# Patient Record
Sex: FEMALE | Race: WHITE | Employment: FULL TIME | URBAN - NONMETROPOLITAN AREA
[De-identification: names, ages, dates, MRNs, and addresses within clinical notes are randomized per-mention and may not be internally consistent; named-entity substitution may affect disease eponyms.]

---

## 2019-08-28 ENCOUNTER — PREP FOR PROCEDURE (OUTPATIENT)
Dept: SURGERY | Age: 24
End: 2019-08-28

## 2023-12-21 ENCOUNTER — HOSPITAL ENCOUNTER (EMERGENCY)
Age: 28
Discharge: HOME | End: 2023-12-21
Payer: COMMERCIAL

## 2023-12-21 ENCOUNTER — APPOINTMENT (OUTPATIENT)
Dept: GENERAL RADIOLOGY | Age: 28
End: 2023-12-21
Payer: COMMERCIAL

## 2023-12-21 VITALS
WEIGHT: 247 LBS | HEIGHT: 62 IN | RESPIRATION RATE: 18 BRPM | HEART RATE: 103 BPM | BODY MASS INDEX: 45.45 KG/M2 | DIASTOLIC BLOOD PRESSURE: 90 MMHG | TEMPERATURE: 98.6 F | OXYGEN SATURATION: 99 % | SYSTOLIC BLOOD PRESSURE: 139 MMHG

## 2023-12-21 DIAGNOSIS — S83.92XA SPRAIN OF LEFT KNEE, UNSPECIFIED LIGAMENT, INITIAL ENCOUNTER: Primary | ICD-10-CM

## 2023-12-21 PROCEDURE — 99283 EMERGENCY DEPT VISIT LOW MDM: CPT

## 2023-12-21 PROCEDURE — 73564 X-RAY EXAM KNEE 4 OR MORE: CPT

## 2023-12-22 NOTE — DISCHARGE INSTRUCTIONS
Call today or tomorrow to follow up with Zenia Miranda, DO  in 3 days. Go to [facility] tomorrow at 1030 am    Use an ice pack or bag filled with ice and apply to the injured area 3 - 4 times a day for 15 - 20 minutes each time. Use ibuprofen or Tylenol (unless prescribed medications that have Tylenol in it) for pain. You can take over the counter Ibuprofen (advil) tablets (4 every 8 hours or 3 every 6 hours or 2 every 4 hours)    Use your crutches for the next several days until you are able to take 10 steps without pain. Return to the Emergency Department for worsening of pain, swelling to the knee, inability to move your knee, unable to walk, any other care or concern.

## 2023-12-22 NOTE — ED TRIAGE NOTES
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 Patient states she felt/heard a pop and then when stepping out of the ambulance she states her knee gave out on her and she fell. Patient denies any LOC or hitting her head during the fall. Patient states she has pain upon ambulation and going up/down steps since the injury. Patient states she feels like her knee is unstable laterally when walking.  Patient is ambulatory and A/O x4 on arrival.

## 2023-12-25 NOTE — ED PROVIDER NOTES
[facility] EMERGENCY DEPT      EMERGENCY MEDICINE     Pt Name: Carmella Baxter  MRN: [id number]  [address] [facility] 1995  Date of evaluation: 12/21/2023  Provider: MARTY Murphy CNP    CHIEF COMPLAINT       Chief Complaint   Patient presents with    Knee Injury     HISTORY OF PRESENT ILLNESS   Krystal Chávez is a pleasant 33 y.o. female who presents to the emergency department from home with c/o left knee injury. 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 She thought that it was just going to be that but then when the patient stepped off her medic and her knee buckled and she fell. .  She has pain with movement, weightbearing, palpation. Limited ability to bear weight. Limited range of motion. Every time she does try to bear weight she feels significant instability.       History is obtained from:  patient  PASTMEDICAL HISTORY     Past Medical History:   Diagnosis Date    ADHD (attention deficit hyperactivity disorder)     Asthma, mild intermittent     Depression with anxiety     Former smoker     GERD (gastroesophageal reflux disease)     Labral tear of shoulder     S/p repair R    Leukocytosis     Migraine headache 01/13/2015    Morbid obesity ([address]) 11/13/2013    Rotator cuff tear, right     s/p Repair    S/P laparoscopic cholecystectomy 08/29/2019       Patient Active Problem List   Diagnosis Code    Morbid obesity ([address]) E66.01    Depression with anxiety F41.8    Labral tear of shoulder S43.439A    Rotator cuff tear, right M75.101    Migraine headache G43.909    GERD (gastroesophageal reflux disease) K21.9    S/P laparoscopic cholecystectomy Z90.49    Former smoker Z87.891    Asthma, mild intermittent J45.20    ADHD (attention deficit hyperactivity disorder) F90.9     SURGICAL HISTORY       Past Surgical History:   Procedure Laterality Date    ANKLE SURGERY Right 09/2018    R ankle tendon repair    CHOLECYSTECTOMY, LAPAROSCOPIC N/A 08/29/2019    CHOLECYSTECTOMY

## 2024-01-05 ENCOUNTER — HOSPITAL ENCOUNTER (OUTPATIENT)
Dept: MRI IMAGING | Age: 29
Discharge: HOME | End: 2024-01-05
Payer: COMMERCIAL

## 2024-01-05 DIAGNOSIS — S83.8X2A SPRAIN OF PATELLA, LEFT, INITIAL ENCOUNTER: ICD-10-CM

## 2024-01-05 PROCEDURE — 73721 MRI JNT OF LWR EXTRE W/O DYE: CPT

## 2024-03-22 ASSESSMENT — PATIENT HEALTH QUESTIONNAIRE - PHQ9
SUM OF ALL RESPONSES TO PHQ QUESTIONS 1-9: 0
6. FEELING BAD ABOUT YOURSELF - OR THAT YOU ARE A FAILURE OR HAVE LET YOURSELF OR YOUR FAMILY DOWN: NOT AT ALL
7. TROUBLE CONCENTRATING ON THINGS, SUCH AS READING THE NEWSPAPER OR WATCHING TELEVISION: NOT AT ALL
5. POOR APPETITE OR OVEREATING: NOT AT ALL
2. FEELING DOWN, DEPRESSED OR HOPELESS: NOT AT ALL
10. IF YOU CHECKED OFF ANY PROBLEMS, HOW DIFFICULT HAVE THESE PROBLEMS MADE IT FOR YOU TO DO YOUR WORK, TAKE CARE OF THINGS AT HOME, OR GET ALONG WITH OTHER PEOPLE: NOT DIFFICULT AT ALL
SUM OF ALL RESPONSES TO PHQ9 QUESTIONS 1 & 2: 0
SUM OF ALL RESPONSES TO PHQ QUESTIONS 1-9: 0
SUM OF ALL RESPONSES TO PHQ QUESTIONS 1-9: 0
3. TROUBLE FALLING OR STAYING ASLEEP: NOT AT ALL
1. LITTLE INTEREST OR PLEASURE IN DOING THINGS: NOT AT ALL
SUM OF ALL RESPONSES TO PHQ QUESTIONS 1-9: 0
4. FEELING TIRED OR HAVING LITTLE ENERGY: NOT AT ALL
9. THOUGHTS THAT YOU WOULD BE BETTER OFF DEAD, OR OF HURTING YOURSELF: NOT AT ALL
8. MOVING OR SPEAKING SO SLOWLY THAT OTHER PEOPLE COULD HAVE NOTICED. OR THE OPPOSITE, BEING SO FIGETY OR RESTLESS THAT YOU HAVE BEEN MOVING AROUND A LOT MORE THAN USUAL: NOT AT ALL

## 2024-03-23 ASSESSMENT — PATIENT HEALTH QUESTIONNAIRE - PHQ9
8. MOVING OR SPEAKING SO SLOWLY THAT OTHER PEOPLE COULD HAVE NOTICED. OR THE OPPOSITE - BEING SO FIDGETY OR RESTLESS THAT YOU HAVE BEEN MOVING AROUND A LOT MORE THAN USUAL: NOT AT ALL
6. FEELING BAD ABOUT YOURSELF - OR THAT YOU ARE A FAILURE OR HAVE LET YOURSELF OR YOUR FAMILY DOWN: NOT AT ALL
1. LITTLE INTEREST OR PLEASURE IN DOING THINGS: NOT AT ALL
7. TROUBLE CONCENTRATING ON THINGS, SUCH AS READING THE NEWSPAPER OR WATCHING TELEVISION: NOT AT ALL
5. POOR APPETITE OR OVEREATING: NOT AT ALL
10. IF YOU CHECKED OFF ANY PROBLEMS, HOW DIFFICULT HAVE THESE PROBLEMS MADE IT FOR YOU TO DO YOUR WORK, TAKE CARE OF THINGS AT HOME, OR GET ALONG WITH OTHER PEOPLE: NOT DIFFICULT AT ALL
2. FEELING DOWN, DEPRESSED OR HOPELESS: NOT AT ALL
SUM OF ALL RESPONSES TO PHQ QUESTIONS 1-9: 0
3. TROUBLE FALLING OR STAYING ASLEEP: NOT AT ALL
4. FEELING TIRED OR HAVING LITTLE ENERGY: NOT AT ALL
9. THOUGHTS THAT YOU WOULD BE BETTER OFF DEAD, OR OF HURTING YOURSELF: NOT AT ALL

## 2024-03-25 NOTE — PROGRESS NOTES
Chief Complaint   Patient presents with    Follow-up     Obesity, struggling to lose weight  Insulin Resistance  ADHD, not doing as well  Migraines    Dog Bite Left Thumb     History obtained from the patient.    SUBJECTIVE:  Krystal Lanza is a 28 y.o. female that presents today for     -Obesity:  Wts back up  On calorie restricted diet  Is exercising regularly  Unable to lose wt, however  Would like to see if can get GLP-1 Covered      -Insulin resistance:   Noted on labs prior  A1c WNL  Insulin remains elevated  Working on TLC's      -ADHD:  On Adderall XR 20mg  Not working as well   Inc trouble with focus  No mood issues      -Migraine HA:  Doing well  Chiro helps with massage  Uses prn Excedrin migraine      -Dog bite:  1 wk ago  L thumb  Known dog, UTD shots  Pt UTD Td  Healing well  Mild numbness.   No weakness      Age/Gender Health Maintenance    Lipid -   Lab Results   Component Value Date    CHOL 190 03/13/2023     Lab Results   Component Value Date    TRIG 183 03/13/2023     Lab Results   Component Value Date    HDL 47 03/13/2023     Lab Results   Component Value Date    LDLCALC 106 03/13/2023     No results found for: \"VLDL\"  No results found for: \"CHOLHDLRATIO\"      DM Screen -   Lab Results   Component Value Date/Time    GLUCOSE 93 12/18/2023 06:35 AM     Lab Results   Component Value Date/Time    LABA1C 5.1 12/18/2023 06:35 AM    LABA1C 5.2 03/13/2023 05:28 PM       Colon Cancer Screening - 2 rectal polyps MAY 2021, repeat 5 years per [facility] (completed d/t BRBPR)  Lung Cancer Screening  - age 50 if meets criteria    Tetanus - UTD MARCH 2016  Influenza Vaccine - UTD FALL 2023  Pneumonia Vaccine - UTD NOV 2019999  Zoster - age 50     Breast Cancer Screening - age 40-50  Cervical Cancer Screening - NEG SEPT 2023  Osteoporosis Screening - age 65      Current Outpatient Medications   Medication Sig Dispense Refill    amphetamine-dextroamphetamine (ADDERALL XR) 25 MG extended release capsule Take

## 2024-03-26 ENCOUNTER — OFFICE VISIT (OUTPATIENT)
Dept: FAMILY MEDICINE CLINIC | Age: 29
End: 2024-03-26
Payer: COMMERCIAL

## 2024-03-26 VITALS
SYSTOLIC BLOOD PRESSURE: 128 MMHG | HEART RATE: 91 BPM | WEIGHT: 252 LBS | OXYGEN SATURATION: 99 % | DIASTOLIC BLOOD PRESSURE: 78 MMHG | RESPIRATION RATE: 18 BRPM | HEIGHT: 62 IN | BODY MASS INDEX: 46.38 KG/M2 | TEMPERATURE: 97.4 F

## 2024-03-26 DIAGNOSIS — F90.0 ATTENTION DEFICIT HYPERACTIVITY DISORDER (ADHD), PREDOMINANTLY INATTENTIVE TYPE: ICD-10-CM
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
## 2024-03-26 DIAGNOSIS — G43.009 MIGRAINE WITHOUT AURA AND WITHOUT STATUS MIGRAINOSUS, NOT INTRACTABLE: ICD-10-CM

## 2024-03-26 DIAGNOSIS — E66.01 MORBID OBESITY WITH BMI OF 45.0-49.9, ADULT (HCC): Primary | ICD-10-CM

## 2024-03-26 DIAGNOSIS — E88.819 INSULIN RESISTANCE: ICD-10-CM

## 2024-03-26 PROCEDURE — 99214 OFFICE O/P EST MOD 30 MIN: CPT | Performed by: FAMILY MEDICINE

## 2024-03-26 RX ORDER — SEMAGLUTIDE 0.5 MG/.5ML
0.5 INJECTION, SOLUTION SUBCUTANEOUS
Qty: 2 ML | Refills: 0 | Status: SHIPPED | OUTPATIENT
Start: 2024-04-23 | End: 2024-05-15

## 2024-03-26 RX ORDER — SEMAGLUTIDE 0.25 MG/.5ML
0.25 INJECTION, SOLUTION SUBCUTANEOUS
Qty: 2 ML | Refills: 0 | Status: SHIPPED | OUTPATIENT
Start: 2024-03-26 | End: 2024-04-17

## 2024-03-26 RX ORDER — SEMAGLUTIDE 1 MG/.5ML
1 INJECTION, SOLUTION SUBCUTANEOUS
Qty: 2 ML | Refills: 0 | Status: SHIPPED | OUTPATIENT
Start: 2024-05-21 | End: 2024-06-12

## 2024-03-26 RX ORDER — DEXTROAMPHETAMINE SACCHARATE, AMPHETAMINE ASPARTATE MONOHYDRATE, DEXTROAMPHETAMINE SULFATE AND AMPHETAMINE SULFATE 6.25; 6.25; 6.25; 6.25 MG/1; MG/1; MG/1; MG/1
25 CAPSULE, EXTENDED RELEASE ORAL EVERY MORNING
Qty: 30 CAPSULE | Refills: 0 | Status: SHIPPED | OUTPATIENT
Start: 2024-03-26 | End: 2024-04-25

## 2024-03-26 RX ORDER — SEMAGLUTIDE 2.4 MG/.75ML
2.4 INJECTION, SOLUTION SUBCUTANEOUS
Qty: 3 ML | Refills: 2 | Status: SHIPPED | OUTPATIENT
Start: 2024-07-16

## 2024-03-26 RX ORDER — SEMAGLUTIDE 1.7 MG/.75ML
1.7 INJECTION, SOLUTION SUBCUTANEOUS
Qty: 3 ML | Refills: 0 | Status: SHIPPED | OUTPATIENT
Start: 2024-06-18 | End: 2024-07-10

## 2024-03-28 ENCOUNTER — TELEPHONE (OUTPATIENT)
Dept: FAMILY MEDICINE CLINIC | Age: 29
End: 2024-03-28

## 2024-03-28 NOTE — TELEPHONE ENCOUNTER
That is also a reasonable option  What program was she planing going through, . [facility] or elsewhere?

## 2024-03-28 NOTE — TELEPHONE ENCOUNTER
Ok  Let pt know wt loss meds are still not a covered benefit.   Would she like to pursue the compounding pharmacy route? We'd discussed this last visit.   Let me know, thanks!

## 2024-03-28 NOTE — TELEPHONE ENCOUNTER
Pt informed and verbalized understanding.     She was thinking about possibly the gastric sleeve-  She wanted to know what you think about this    She would need a 3 month documented supervision with diet program-

## 2024-03-28 NOTE — TELEPHONE ENCOUNTER
Pt states it would be [facility]    She hasn't researched it a lot , she will check into it some more and get back with you

## 2025-01-02 ENCOUNTER — LAB (OUTPATIENT)
Dept: LAB | Age: 30
End: 2025-01-02

## 2025-01-02 LAB
ALT SERPL W/O P-5'-P-CCNC: 13 U/L (ref 11–66)
AST SERPL-CCNC: 13 U/L (ref 5–40)
BUN SERPL-MCNC: 4 MG/DL (ref 7–22)
CREAT SERPL-MCNC: 0.5 MG/DL (ref 0.4–1.2)
CREAT UR-MCNC: 68.8 MG/DL
DEPRECATED RDW RBC AUTO: 45.8 FL (ref 35–45)
ERYTHROCYTE [DISTWIDTH] IN BLOOD BY AUTOMATED COUNT: 13.2 % (ref 11.5–14.5)
GFR SERPL CREATININE-BSD FRML MDRD: > 90 ML/MIN/1.73M2
GLUCOSE SERPL-MCNC: 166 MG/DL (ref 69–140)
HCT VFR BLD AUTO: 35.8 % (ref 37–47)
HGB BLD-MCNC: 11.7 GM/DL (ref 12–16)
MCH RBC QN AUTO: 30.8 PG (ref 26–33)
MCHC RBC AUTO-ENTMCNC: 32.7 GM/DL (ref 32.2–35.5)
MCV RBC AUTO: 94.2 FL (ref 81–99)
PLATELET # BLD AUTO: 234 THOU/MM3 (ref 130–400)
PMV BLD AUTO: 11.9 FL (ref 9.4–12.4)
PROT UR-MCNC: 8.9 MG/DL
PROT/CREAT 24H UR: 0.13 MG/G{CREAT}
RBC # BLD AUTO: 3.8 MILL/MM3 (ref 4.2–5.4)
WBC # BLD AUTO: 12.6 THOU/MM3 (ref 4.8–10.8)

## 2025-01-03 LAB — RPR SER QL: NONREACTIVE

## 2025-01-04 ENCOUNTER — LAB (OUTPATIENT)
Dept: LAB | Age: 30
End: 2025-01-04

## 2025-01-04 LAB
GLUCOSE 1H P 75 G GLC PO SERPL-MCNC: 146 MG/DL (ref 120–170)
GLUCOSE 2H P 75 G GLC PO SERPL-MCNC: 147 MG/DL (ref 70–120)
GLUCOSE 3H P 75 G GLC PO SERPL-MCNC: 122 MG/DL (ref 70–120)
GLUCOSE SERPL-MCNC: 94 MG/DL (ref 69–105)

## 2025-03-16 ENCOUNTER — HOSPITAL ENCOUNTER (INPATIENT)
Age: 30
LOS: 3 days | Discharge: HOME | End: 2025-03-19
Attending: OBSTETRICS & GYNECOLOGY | Admitting: OBSTETRICS & GYNECOLOGY
Payer: COMMERCIAL

## 2025-03-16 ENCOUNTER — APPOINTMENT (OUTPATIENT)
Dept: LABOR AND DELIVERY | Age: 30
End: 2025-03-16
Payer: COMMERCIAL

## 2025-03-16 PROBLEM — Z34.90 ENCOUNTER FOR INDUCTION OF LABOR: Status: ACTIVE | Noted: 2025-03-16

## 2025-03-16 LAB
AMPHETAMINES UR QL SCN: NEGATIVE
BARBITURATES UR QL SCN: NEGATIVE
BASOPHILS ABSOLUTE: 0 THOU/MM3 (ref 0–0.1)
BASOPHILS NFR BLD AUTO: 0.3 %
BENZODIAZ UR QL SCN: NEGATIVE
BZE UR QL SCN: NEGATIVE
CANNABINOIDS UR QL SCN: NEGATIVE
DEPRECATED RDW RBC AUTO: 43.7 FL (ref 35–45)
EOSINOPHIL NFR BLD AUTO: 0.6 %
EOSINOPHILS ABSOLUTE: 0.1 THOU/MM3 (ref 0–0.4)
ERYTHROCYTE [DISTWIDTH] IN BLOOD BY AUTOMATED COUNT: 13.5 % (ref 11.5–14.5)
FENTANYL: NEGATIVE
HCT VFR BLD AUTO: 34.2 % (ref 37–47)
HGB BLD-MCNC: 11.9 GM/DL (ref 12–16)
IMM GRANULOCYTES # BLD AUTO: 0.12 THOU/MM3 (ref 0–0.07)
IMM GRANULOCYTES NFR BLD AUTO: 0.8 %
LYMPHOCYTES ABSOLUTE: 1.6 THOU/MM3 (ref 1–4.8)
LYMPHOCYTES NFR BLD AUTO: 11.2 %
MCH RBC QN AUTO: 31.1 PG (ref 26–33)
MCHC RBC AUTO-ENTMCNC: 34.8 GM/DL (ref 32.2–35.5)
MCV RBC AUTO: 89.3 FL (ref 81–99)
MONOCYTES ABSOLUTE: 0.9 THOU/MM3 (ref 0.4–1.3)
MONOCYTES NFR BLD AUTO: 6 %
NEUTROPHILS ABSOLUTE: 11.8 THOU/MM3 (ref 1.8–7.7)
NEUTROPHILS NFR BLD AUTO: 81.1 %
NRBC BLD AUTO-RTO: 0 /100 WBC
OPIATES UR QL SCN: NEGATIVE
OXYCODONE: NEGATIVE
PCP UR QL SCN: NEGATIVE
PLATELET # BLD AUTO: 284 THOU/MM3 (ref 130–400)
PMV BLD AUTO: 11.5 FL (ref 9.4–12.4)
RBC # BLD AUTO: 3.83 MILL/MM3 (ref 4.2–5.4)
WBC # BLD AUTO: 14.5 THOU/MM3 (ref 4.8–10.8)

## 2025-03-16 PROCEDURE — 80307 DRUG TEST PRSMV CHEM ANLYZR: CPT

## 2025-03-16 PROCEDURE — 6370000000 HC RX 637 (ALT 250 FOR IP): Performed by: OBSTETRICS & GYNECOLOGY

## 2025-03-16 PROCEDURE — 86592 SYPHILIS TEST NON-TREP QUAL: CPT

## 2025-03-16 PROCEDURE — 6360000002 HC RX W HCPCS: Performed by: OBSTETRICS & GYNECOLOGY

## 2025-03-16 PROCEDURE — 86900 BLOOD TYPING SEROLOGIC ABO: CPT

## 2025-03-16 PROCEDURE — 1220000001 HC SEMI PRIVATE L&D R&B

## 2025-03-16 PROCEDURE — 2580000003 HC RX 258: Performed by: OBSTETRICS & GYNECOLOGY

## 2025-03-16 PROCEDURE — 85025 COMPLETE CBC W/AUTO DIFF WBC: CPT

## 2025-03-16 PROCEDURE — 86901 BLOOD TYPING SEROLOGIC RH(D): CPT

## 2025-03-16 PROCEDURE — 86885 COOMBS TEST INDIRECT QUAL: CPT

## 2025-03-16 RX ORDER — BUTORPHANOL TARTRATE 1 MG/ML
1 INJECTION, SOLUTION INTRAMUSCULAR; INTRAVENOUS
Status: DISCONTINUED | OUTPATIENT
Start: 2025-03-16 | End: 2025-03-18 | Stop reason: HOSPADM

## 2025-03-16 RX ORDER — ACETAMINOPHEN 325 MG/1
650 TABLET ORAL EVERY 4 HOURS PRN
Status: DISCONTINUED | OUTPATIENT
Start: 2025-03-16 | End: 2025-03-18 | Stop reason: HOSPADM

## 2025-03-16 RX ORDER — NIFEDIPINE 90 MG/1
90 TABLET, EXTENDED RELEASE ORAL DAILY
Status: ON HOLD | COMMUNITY
End: 2025-03-19 | Stop reason: HOSPADM

## 2025-03-16 RX ORDER — SODIUM CHLORIDE, SODIUM LACTATE, POTASSIUM CHLORIDE, CALCIUM CHLORIDE 600; 310; 30; 20 MG/100ML; MG/100ML; MG/100ML; MG/100ML
INJECTION, SOLUTION INTRAVENOUS CONTINUOUS
Status: DISCONTINUED | OUTPATIENT
Start: 2025-03-16 | End: 2025-03-18 | Stop reason: HOSPADM

## 2025-03-16 RX ORDER — OXYCODONE HYDROCHLORIDE 5 MG/1
5 TABLET ORAL EVERY 4 HOURS PRN
Refills: 0 | Status: DISCONTINUED | OUTPATIENT
Start: 2025-03-16 | End: 2025-03-18 | Stop reason: HOSPADM

## 2025-03-16 RX ORDER — MISOPROSTOL 200 UG/1
1000 TABLET ORAL PRN
Status: DISCONTINUED | OUTPATIENT
Start: 2025-03-16 | End: 2025-03-18

## 2025-03-16 RX ORDER — OXYTOCIN/0.9 % SODIUM CHLORIDE 30/500 ML
87.3 PLASTIC BAG, INJECTION (ML) INTRAVENOUS PRN
Status: DISCONTINUED | OUTPATIENT
Start: 2025-03-16 | End: 2025-03-18

## 2025-03-16 RX ORDER — DIPHENHYDRAMINE HCL 25 MG
25 TABLET ORAL EVERY 4 HOURS PRN
Status: DISCONTINUED | OUTPATIENT
Start: 2025-03-16 | End: 2025-03-18 | Stop reason: HOSPADM

## 2025-03-16 RX ORDER — DIPHENHYDRAMINE HYDROCHLORIDE 50 MG/ML
25 INJECTION, SOLUTION INTRAMUSCULAR; INTRAVENOUS EVERY 4 HOURS PRN
Status: DISCONTINUED | OUTPATIENT
Start: 2025-03-16 | End: 2025-03-18 | Stop reason: HOSPADM

## 2025-03-16 RX ORDER — SODIUM CHLORIDE 0.9 % (FLUSH) 0.9 %
5-40 SYRINGE (ML) INJECTION EVERY 12 HOURS SCHEDULED
Status: DISCONTINUED | OUTPATIENT
Start: 2025-03-16 | End: 2025-03-18 | Stop reason: HOSPADM

## 2025-03-16 RX ORDER — ZOLPIDEM TARTRATE 5 MG/1
5 TABLET ORAL NIGHTLY PRN
Status: DISCONTINUED | OUTPATIENT
Start: 2025-03-16 | End: 2025-03-18

## 2025-03-16 RX ORDER — FENTANYL CITRATE 50 UG/ML
100 INJECTION, SOLUTION INTRAMUSCULAR; INTRAVENOUS
Refills: 0 | Status: DISCONTINUED | OUTPATIENT
Start: 2025-03-16 | End: 2025-03-18 | Stop reason: HOSPADM

## 2025-03-16 RX ORDER — SODIUM CHLORIDE 9 MG/ML
INJECTION, SOLUTION INTRAVENOUS PRN
Status: DISCONTINUED | OUTPATIENT
Start: 2025-03-16 | End: 2025-03-18 | Stop reason: HOSPADM

## 2025-03-16 RX ORDER — OXYTOCIN/0.9 % SODIUM CHLORIDE 30/500 ML
1-20 PLASTIC BAG, INJECTION (ML) INTRAVENOUS CONTINUOUS
Status: DISCONTINUED | OUTPATIENT
Start: 2025-03-16 | End: 2025-03-18 | Stop reason: HOSPADM

## 2025-03-16 RX ORDER — DOCUSATE SODIUM 100 MG/1
100 CAPSULE, LIQUID FILLED ORAL 2 TIMES DAILY PRN
Status: DISCONTINUED | OUTPATIENT
Start: 2025-03-16 | End: 2025-03-18 | Stop reason: HOSPADM

## 2025-03-16 RX ORDER — PROMETHAZINE HYDROCHLORIDE 25 MG/1
25 TABLET ORAL
Status: COMPLETED | OUTPATIENT
Start: 2025-03-16 | End: 2025-03-16

## 2025-03-16 RX ORDER — SODIUM CHLORIDE, SODIUM LACTATE, POTASSIUM CHLORIDE, AND CALCIUM CHLORIDE .6; .31; .03; .02 G/100ML; G/100ML; G/100ML; G/100ML
500 INJECTION, SOLUTION INTRAVENOUS PRN
Status: DISCONTINUED | OUTPATIENT
Start: 2025-03-16 | End: 2025-03-18 | Stop reason: HOSPADM

## 2025-03-16 RX ORDER — SEVOFLURANE 250 ML/250ML
1 LIQUID RESPIRATORY (INHALATION) CONTINUOUS PRN
Status: DISCONTINUED | OUTPATIENT
Start: 2025-03-16 | End: 2025-03-18 | Stop reason: HOSPADM

## 2025-03-16 RX ORDER — ONDANSETRON 4 MG/1
4 TABLET, ORALLY DISINTEGRATING ORAL EVERY 6 HOURS PRN
Status: DISCONTINUED | OUTPATIENT
Start: 2025-03-16 | End: 2025-03-18

## 2025-03-16 RX ORDER — SODIUM CHLORIDE, SODIUM LACTATE, POTASSIUM CHLORIDE, AND CALCIUM CHLORIDE .6; .31; .03; .02 G/100ML; G/100ML; G/100ML; G/100ML
1000 INJECTION, SOLUTION INTRAVENOUS PRN
Status: DISCONTINUED | OUTPATIENT
Start: 2025-03-16 | End: 2025-03-18 | Stop reason: HOSPADM

## 2025-03-16 RX ORDER — CARBOPROST TROMETHAMINE 250 UG/ML
250 INJECTION, SOLUTION INTRAMUSCULAR PRN
Status: DISCONTINUED | OUTPATIENT
Start: 2025-03-16 | End: 2025-03-18 | Stop reason: HOSPADM

## 2025-03-16 RX ORDER — ONDANSETRON 2 MG/ML
4 INJECTION INTRAMUSCULAR; INTRAVENOUS EVERY 6 HOURS PRN
Status: DISCONTINUED | OUTPATIENT
Start: 2025-03-16 | End: 2025-03-18

## 2025-03-16 RX ORDER — SODIUM CHLORIDE 0.9 % (FLUSH) 0.9 %
5-40 SYRINGE (ML) INJECTION PRN
Status: DISCONTINUED | OUTPATIENT
Start: 2025-03-16 | End: 2025-03-18 | Stop reason: HOSPADM

## 2025-03-16 RX ORDER — MEPERIDINE HYDROCHLORIDE 25 MG/ML
50 INJECTION INTRAMUSCULAR; INTRAVENOUS; SUBCUTANEOUS
Status: COMPLETED | OUTPATIENT
Start: 2025-03-16 | End: 2025-03-16

## 2025-03-16 RX ORDER — TRANEXAMIC ACID 10 MG/ML
1000 INJECTION, SOLUTION INTRAVENOUS
Status: ACTIVE | OUTPATIENT
Start: 2025-03-16 | End: 2025-03-17

## 2025-03-16 RX ORDER — METHYLERGONOVINE MALEATE 0.2 MG/ML
200 INJECTION INTRAVENOUS PRN
Status: DISCONTINUED | OUTPATIENT
Start: 2025-03-16 | End: 2025-03-18 | Stop reason: HOSPADM

## 2025-03-16 RX ORDER — TERBUTALINE SULFATE 1 MG/ML
0.25 INJECTION SUBCUTANEOUS
Status: ACTIVE | OUTPATIENT
Start: 2025-03-16 | End: 2025-03-17

## 2025-03-16 RX ADMIN — SODIUM CHLORIDE, POTASSIUM CHLORIDE, SODIUM LACTATE AND CALCIUM CHLORIDE: 600; 310; 30; 20 INJECTION, SOLUTION INTRAVENOUS at 18:44

## 2025-03-16 RX ADMIN — MEPERIDINE HYDROCHLORIDE 50 MG: 25 INJECTION, SOLUTION INTRAMUSCULAR; INTRAVENOUS; SUBCUTANEOUS at 22:20

## 2025-03-16 RX ADMIN — Medication 1 MILLI-UNITS/MIN: at 19:49

## 2025-03-16 RX ADMIN — PROMETHAZINE HYDROCHLORIDE 25 MG: 25 TABLET ORAL at 22:20

## 2025-03-16 ASSESSMENT — PAIN DESCRIPTION - LOCATION: LOCATION: ABDOMEN

## 2025-03-16 ASSESSMENT — PAIN SCALES - GENERAL: PAINLEVEL_OUTOF10: 5

## 2025-03-16 ASSESSMENT — PAIN DESCRIPTION - DESCRIPTORS: DESCRIPTORS: CRAMPING

## 2025-03-16 ASSESSMENT — PAIN DESCRIPTION - ORIENTATION: ORIENTATION: LOWER;MID

## 2025-03-16 NOTE — FLOWSHEET NOTE
36+6/7 wks pt Dr Alberts here with  for scheduled induction due to gestational hypertension. Pt states she takes procardia daily. Pt denies any visual changes,epigastric pain or headaches. +fetal movement. Denies feeling cramping,ctxs,leaking of vaginal fluid or bleeding. Oriented to room and plan of care. Verbal consent obtained for universal drug screen. Changing into gown.

## 2025-03-16 NOTE — FLOWSHEET NOTE
Dr Blum called unit and updated her scheduled fontana induction is here. FHTs reactive,rare ctx noted. /79.Pt denies headache,visual changes or epigastric pain. No ve done at this time. Orders received and RN to see if OB hospitalist will place fontana bulb and once fontana bulb falls out if she will break water. Orders to hold pts procardia for now. PLan of care reviewed with pt and .

## 2025-03-16 NOTE — FLOWSHEET NOTE
Dr Contreras at [facility] and updated on pt  36+6/7 wks of Dr Blum here for fontana bulb induction due to gestational hypertension. Pt has been taking procardia 90mg daily and last dose this afternoon at 2pm. /79. Pt denies headache, visual changes or epigastric pain. Reviewed pts history with MD. MD aware Dr Blum would like her to place fontana bulb and once fontana bulb falls out if she would break water. Reviewed Dr Blums orders with MD. MD states she will be in to place fontana bulb.

## 2025-03-17 ENCOUNTER — ANESTHESIA EVENT (OUTPATIENT)
Dept: LABOR AND DELIVERY | Age: 30
End: 2025-03-17
Payer: COMMERCIAL

## 2025-03-17 ENCOUNTER — ANESTHESIA (OUTPATIENT)
Dept: LABOR AND DELIVERY | Age: 30
End: 2025-03-17
Payer: COMMERCIAL

## 2025-03-17 LAB
ABO GROUP BLD: NORMAL
IAT IGG-SP REAG SERPL QL: NORMAL
RH BLD: NORMAL
RPR SER QL: NONREACTIVE

## 2025-03-17 PROCEDURE — 1220000001 HC SEMI PRIVATE L&D R&B

## 2025-03-17 PROCEDURE — 6370000000 HC RX 637 (ALT 250 FOR IP): Performed by: OBSTETRICS & GYNECOLOGY

## 2025-03-17 PROCEDURE — 6360000002 HC RX W HCPCS: Performed by: OBSTETRICS & GYNECOLOGY

## 2025-03-17 PROCEDURE — 3700000000 HC ANESTHESIA ATTENDED CARE: Performed by: OBSTETRICS & GYNECOLOGY

## 2025-03-17 PROCEDURE — 3700000001 HC ADD 15 MINUTES (ANESTHESIA): Performed by: OBSTETRICS & GYNECOLOGY

## 2025-03-17 PROCEDURE — 2709999900 HC NON-CHARGEABLE SUPPLY: Performed by: OBSTETRICS & GYNECOLOGY

## 2025-03-17 PROCEDURE — 7100000001 HC PACU RECOVERY - ADDTL 15 MIN: Performed by: OBSTETRICS & GYNECOLOGY

## 2025-03-17 PROCEDURE — 2500000003 HC RX 250 WO HCPCS: Performed by: OBSTETRICS & GYNECOLOGY

## 2025-03-17 PROCEDURE — 10907ZC DRAINAGE OF AMNIOTIC FLUID, THERAPEUTIC FROM PRODUCTS OF CONCEPTION, VIA NATURAL OR ARTIFICIAL OPENING: ICD-10-PCS | Performed by: OBSTETRICS & GYNECOLOGY

## 2025-03-17 PROCEDURE — 2580000003 HC RX 258: Performed by: OBSTETRICS & GYNECOLOGY

## 2025-03-17 PROCEDURE — 7100000000 HC PACU RECOVERY - FIRST 15 MIN: Performed by: OBSTETRICS & GYNECOLOGY

## 2025-03-17 PROCEDURE — 2500000003 HC RX 250 WO HCPCS: Performed by: ANESTHESIOLOGY

## 2025-03-17 PROCEDURE — 6360000002 HC RX W HCPCS: Performed by: NURSE ANESTHETIST, CERTIFIED REGISTERED

## 2025-03-17 PROCEDURE — 3609079900 HC CESAREAN SECTION: Performed by: OBSTETRICS & GYNECOLOGY

## 2025-03-17 PROCEDURE — 6360000002 HC RX W HCPCS

## 2025-03-17 PROCEDURE — 3700000025 EPIDURAL BLOCK: Performed by: ANESTHESIOLOGY

## 2025-03-17 RX ORDER — NALOXONE HYDROCHLORIDE 0.4 MG/ML
INJECTION, SOLUTION INTRAMUSCULAR; INTRAVENOUS; SUBCUTANEOUS PRN
Status: DISCONTINUED | OUTPATIENT
Start: 2025-03-17 | End: 2025-03-18 | Stop reason: HOSPADM

## 2025-03-17 RX ORDER — KETOROLAC TROMETHAMINE 30 MG/ML
INJECTION, SOLUTION INTRAMUSCULAR; INTRAVENOUS
Status: DISCONTINUED | OUTPATIENT
Start: 2025-03-17 | End: 2025-03-17 | Stop reason: SDUPTHER

## 2025-03-17 RX ORDER — HYDROXYZINE PAMOATE 25 MG/1
25 CAPSULE ORAL ONCE
Status: COMPLETED | OUTPATIENT
Start: 2025-03-17 | End: 2025-03-17

## 2025-03-17 RX ORDER — ONDANSETRON 2 MG/ML
8 INJECTION INTRAMUSCULAR; INTRAVENOUS EVERY 6 HOURS PRN
Status: DISCONTINUED | OUTPATIENT
Start: 2025-03-17 | End: 2025-03-18

## 2025-03-17 RX ORDER — ROPIVACAINE HYDROCHLORIDE 2 MG/ML
INJECTION, SOLUTION EPIDURAL; INFILTRATION; PERINEURAL
Status: COMPLETED
Start: 2025-03-17 | End: 2025-03-17

## 2025-03-17 RX ORDER — AZITHROMYCIN MONOHYDRATE 500 MG/5ML
INJECTION, POWDER, LYOPHILIZED, FOR SOLUTION INTRAVENOUS
Status: DISCONTINUED | OUTPATIENT
Start: 2025-03-17 | End: 2025-03-17 | Stop reason: SDUPTHER

## 2025-03-17 RX ORDER — ONDANSETRON 2 MG/ML
INJECTION INTRAMUSCULAR; INTRAVENOUS
Status: DISCONTINUED | OUTPATIENT
Start: 2025-03-17 | End: 2025-03-17 | Stop reason: SDUPTHER

## 2025-03-17 RX ORDER — MORPHINE SULFATE 0.5 MG/ML
INJECTION, SOLUTION EPIDURAL; INTRATHECAL; INTRAVENOUS
Status: DISCONTINUED | OUTPATIENT
Start: 2025-03-17 | End: 2025-03-17 | Stop reason: SDUPTHER

## 2025-03-17 RX ORDER — CITRIC ACID/SODIUM CITRATE 334-500MG
15 SOLUTION, ORAL ORAL ONCE
Status: COMPLETED | OUTPATIENT
Start: 2025-03-17 | End: 2025-03-17

## 2025-03-17 RX ORDER — PHENYLEPHRINE HCL IN 0.9% NACL 1 MG/10 ML
SYRINGE (ML) INTRAVENOUS
Status: DISCONTINUED | OUTPATIENT
Start: 2025-03-17 | End: 2025-03-17 | Stop reason: SDUPTHER

## 2025-03-17 RX ORDER — METOCLOPRAMIDE HYDROCHLORIDE 5 MG/ML
10 INJECTION INTRAMUSCULAR; INTRAVENOUS ONCE
Status: COMPLETED | OUTPATIENT
Start: 2025-03-17 | End: 2025-03-17

## 2025-03-17 RX ORDER — ACETAMINOPHEN 500 MG
1000 TABLET ORAL ONCE
Status: COMPLETED | OUTPATIENT
Start: 2025-03-17 | End: 2025-03-17

## 2025-03-17 RX ORDER — ROPIVACAINE HYDROCHLORIDE 2 MG/ML
INJECTION, SOLUTION EPIDURAL; INFILTRATION; PERINEURAL
Status: DISCONTINUED | OUTPATIENT
Start: 2025-03-17 | End: 2025-03-17 | Stop reason: SDUPTHER

## 2025-03-17 RX ORDER — OXYTOCIN 10 [USP'U]/ML
INJECTION, SOLUTION INTRAMUSCULAR; INTRAVENOUS
Status: DISCONTINUED | OUTPATIENT
Start: 2025-03-17 | End: 2025-03-17 | Stop reason: SDUPTHER

## 2025-03-17 RX ORDER — LIDOCAINE HCL/EPINEPHRINE/PF 2%-1:200K
VIAL (ML) INJECTION
Status: DISCONTINUED | OUTPATIENT
Start: 2025-03-17 | End: 2025-03-17 | Stop reason: SDUPTHER

## 2025-03-17 RX ORDER — DIPHENHYDRAMINE HYDROCHLORIDE 50 MG/ML
INJECTION, SOLUTION INTRAMUSCULAR; INTRAVENOUS
Status: DISCONTINUED | OUTPATIENT
Start: 2025-03-17 | End: 2025-03-17 | Stop reason: SDUPTHER

## 2025-03-17 RX ORDER — OXYTOCIN/0.9 % SODIUM CHLORIDE 30/500 ML
87.3 PLASTIC BAG, INJECTION (ML) INTRAVENOUS CONTINUOUS PRN
Status: DISCONTINUED | OUTPATIENT
Start: 2025-03-17 | End: 2025-03-18

## 2025-03-17 RX ORDER — ONDANSETRON 2 MG/ML
4 INJECTION INTRAMUSCULAR; INTRAVENOUS EVERY 6 HOURS PRN
Status: DISCONTINUED | OUTPATIENT
Start: 2025-03-17 | End: 2025-03-18 | Stop reason: HOSPADM

## 2025-03-17 RX ADMIN — KETOROLAC TROMETHAMINE 30 MG: 30 INJECTION, SOLUTION INTRAMUSCULAR; INTRAVENOUS at 22:32

## 2025-03-17 RX ADMIN — Medication 100 MCG: at 21:51

## 2025-03-17 RX ADMIN — METOCLOPRAMIDE HYDROCHLORIDE 10 MG: 5 INJECTION INTRAMUSCULAR; INTRAVENOUS at 21:33

## 2025-03-17 RX ADMIN — DIPHENHYDRAMINE HYDROCHLORIDE 6.25 MG: 50 INJECTION INTRAMUSCULAR; INTRAVENOUS at 22:14

## 2025-03-17 RX ADMIN — SODIUM CITRATE AND CITRIC ACID MONOHYDRATE 15 ML: 500; 334 SOLUTION ORAL at 21:35

## 2025-03-17 RX ADMIN — ACETAMINOPHEN 1000 MG: 500 TABLET ORAL at 21:35

## 2025-03-17 RX ADMIN — ONDANSETRON 4 MG: 2 INJECTION, SOLUTION INTRAMUSCULAR; INTRAVENOUS at 22:14

## 2025-03-17 RX ADMIN — MORPHINE SULFATE 3 MG: 0.5 INJECTION, SOLUTION EPIDURAL; INTRATHECAL; INTRAVENOUS at 22:18

## 2025-03-17 RX ADMIN — FAMOTIDINE 20 MG: 10 INJECTION, SOLUTION INTRAVENOUS at 11:32

## 2025-03-17 RX ADMIN — ROPIVACAINE HYDROCHLORIDE 10 ML: 2 INJECTION, SOLUTION EPIDURAL; INFILTRATION at 15:13

## 2025-03-17 RX ADMIN — SODIUM CHLORIDE, POTASSIUM CHLORIDE, SODIUM LACTATE AND CALCIUM CHLORIDE: 600; 310; 30; 20 INJECTION, SOLUTION INTRAVENOUS at 15:47

## 2025-03-17 RX ADMIN — SODIUM CHLORIDE, POTASSIUM CHLORIDE, SODIUM LACTATE AND CALCIUM CHLORIDE: 600; 310; 30; 20 INJECTION, SOLUTION INTRAVENOUS at 02:21

## 2025-03-17 RX ADMIN — AZITHROMYCIN MONOHYDRATE 500 MG: 500 INJECTION, POWDER, LYOPHILIZED, FOR SOLUTION INTRAVENOUS at 21:39

## 2025-03-17 RX ADMIN — HYDROXYZINE PAMOATE 25 MG: 25 CAPSULE ORAL at 21:37

## 2025-03-17 RX ADMIN — SODIUM CHLORIDE, POTASSIUM CHLORIDE, SODIUM LACTATE AND CALCIUM CHLORIDE: 600; 310; 30; 20 INJECTION, SOLUTION INTRAVENOUS at 10:32

## 2025-03-17 RX ADMIN — KETOROLAC TROMETHAMINE 30 MG: 60 INJECTION, SOLUTION INTRAMUSCULAR at 22:32

## 2025-03-17 RX ADMIN — ONDANSETRON 4 MG: 2 INJECTION, SOLUTION INTRAMUSCULAR; INTRAVENOUS at 13:40

## 2025-03-17 RX ADMIN — FAMOTIDINE 20 MG: 10 INJECTION, SOLUTION INTRAVENOUS at 21:33

## 2025-03-17 RX ADMIN — SODIUM CHLORIDE, POTASSIUM CHLORIDE, SODIUM LACTATE AND CALCIUM CHLORIDE: 600; 310; 30; 20 INJECTION, SOLUTION INTRAVENOUS at 22:12

## 2025-03-17 RX ADMIN — Medication 18 ML/HR: at 15:13

## 2025-03-17 RX ADMIN — Medication 3000 MG: at 21:39

## 2025-03-17 RX ADMIN — FENTANYL CITRATE 100 MCG: 50 INJECTION, SOLUTION INTRAMUSCULAR; INTRAVENOUS at 10:24

## 2025-03-17 RX ADMIN — MORPHINE SULFATE 2 MG: 0.5 INJECTION, SOLUTION EPIDURAL; INTRATHECAL; INTRAVENOUS at 22:14

## 2025-03-17 RX ADMIN — ONDANSETRON 4 MG: 2 INJECTION, SOLUTION INTRAMUSCULAR; INTRAVENOUS at 21:38

## 2025-03-17 RX ADMIN — OXYTOCIN 20 UNITS: 10 INJECTION, SOLUTION INTRAMUSCULAR; INTRAVENOUS at 22:08

## 2025-03-17 RX ADMIN — LIDOCAINE HYDROCHLORIDE,EPINEPHRINE BITARTRATE 10 ML: 20; .005 INJECTION, SOLUTION EPIDURAL; INFILTRATION; INTRACAUDAL; PERINEURAL at 21:38

## 2025-03-17 RX ADMIN — Medication 87.3 MILLI-UNITS/MIN: at 22:57

## 2025-03-17 ASSESSMENT — PAIN SCALES - GENERAL: PAINLEVEL_OUTOF10: 6

## 2025-03-17 ASSESSMENT — PAIN DESCRIPTION - LOCATION: LOCATION: ABDOMEN

## 2025-03-17 NOTE — PROGRESS NOTES
Department of Obstetrics and Gynecology  Labor and Delivery   Triage Note  [facility]  Late entry - placed just after 1900    Pt Name: Krystal Lanza  MRN: [id number] Acct #: [id number]  YOB: 1995  Procedure Performed By: Shiela Contreras MD        SUBJECTIVE:   29 year old  at 36w 6d  patient of Dr. Blum, came in for IOL due to gestation htn, on procardia. Dr Blum asked me to place the fontana for IOL    Patient denies any decreased fetal movement, vaginal bleeding, leaking of fluid or decreased fetal movement.  OBJECTIVE:  Vitals:  /83   Pulse (!) 102   Temp (!) 96.1 °F (35.6 °C) (Temporal)   Resp 16   Ht 1.6 m (5' 3\")   Wt 127 kg (280 lb)   SpO2 99%   BMI 49.60 kg/m²   CONSTITUTIONAL:  awake, alert, cooperative, no apparent distress, and appears stated age  ABDOMEN:  No scars, normal bowel sounds, soft, non-distended, non-tender, no masses palpated, no hepatosplenomegally, uterus is gravid, soft, non-tender  GENITAL/URINARY:  External Genitalia:  General appearance; normal, Hair distribution; normal, Lesions absent  Cervix:  visualized  with speculum after unsuccessful digital placement. 1cm.  Fontana placed with speculum successfully  Fetal Position:  Cephalic    Membranes:  Intact  Fetal heart rate:         Baseline Heart Rate:  130 cat 1 NSt       Accelerations:  present       Decelerations:  none       Variability:  moderate    Contraction frequency: irregular minutes    Labs Reviewed   CBC WITH AUTO DIFFERENTIAL - Abnormal; Notable for the following components:       Result Value    WBC 14.5 (*)     RBC 3.83 (*)     Hemoglobin 11.9 (*)     Hematocrit 34.2 (*)     Neutrophils Absolute 11.8 (*)     Immature Grans (Abs) 0.12 (*)     All other components within normal limits   URINE DRUG SCREEN   RPR   TYPE AND SCREEN     ASSESSMENT & PLAN:    36wk 6d IOL patient of Dr Blum for Gestational htn on medication  Fontana placed at request    Shiela Contreras MD

## 2025-03-17 NOTE — FLOWSHEET NOTE
Cervical fontana balloon spontaneously expelled, intact. Scant amount of blood noted at bladder opening of catheter, none noted on balloon. No clots noted. No further vaginal bleeding or trickling noted at this time. Patient tolerated well, denies any additional needs.

## 2025-03-17 NOTE — FLOWSHEET NOTE
Tension applied to cervical fontana until resistance met and secured with tape. Patient tolerating well, denies any additional needs at this time.

## 2025-03-17 NOTE — FLOWSHEET NOTE
Cervical fontana balloon inserted w/o complication using sterile speculum by Dr. Contreras. Balloon inflated with 60 ml saline easily. Patient tolerated well, denies any additional needs at this time.

## 2025-03-17 NOTE — FLOWSHEET NOTE
Patient called out stating blood is coming out of the fontana catheter drainage port. Bright red, fluid-tinged blood noted at end of catheter and scant amount noted on chucks pad. Dr. Contreras at nurses station, RN requested MD to evaluate. MD at bedside performed SVE. Stated continue POC, no new orders at this time.

## 2025-03-17 NOTE — H&P
[facility]  History and Physical Update    Pt Name: Krystal Lanza  MRN: [id number]  YOB: 1995  Date of evaluation: 3/17/2025    [x] I have examined the patient and reviewed the H&P/Consult and there are no changes to the patient or plans.  28yo  admitted overnight for fontana IOL now 37wks. AROM this morning and 3cm cervix. FHT category 1.     [] I have examined the patient and reviewed the H&P/Consult and have noted the following changes:            Roselyn Blum MD,MD  Electronically signed 3/17/2025 at 7:37 AM

## 2025-03-17 NOTE — FLOWSHEET NOTE
Dr Blum called unit update given; pt continues to labor without an epidural, pitocin at 6 MU with FSE and IUPC in place. Cat 1 fetal tracing with moderate variability and accels, contractions every 2-3 minutes. No change in POC.

## 2025-03-17 NOTE — ANESTHESIA PRE PROCEDURE
Problem List   Diagnosis Code    Morbid obesity E66.01    Depression with anxiety F41.8    Labral tear of shoulder S43.439A    Rotator cuff tear, right M75.101    Migraine headache G43.909    GERD (gastroesophageal reflux disease) K21.9    S/P laparoscopic cholecystectomy Z90.49    Former smoker Z87.891    Asthma, mild intermittent J45.20    ADHD (attention deficit hyperactivity disorder) F90.9    Encounter for induction of labor Z34.90       Past Medical History:        Diagnosis Date    ADHD (attention deficit hyperactivity disorder)     Asthma, mild intermittent     on exertion ,denies using inhaler with pregnancy    Complication of anesthesia     need to give more versed than usual if you give to pt    Depression with anxiety     no meds    Former smoker     GERD (gastroesophageal reflux disease)     Gestational hypertension     started procardia at 24 weeks gestation    Labral tear of shoulder     S/p repair R    Leukocytosis     Migraine headache 01/13/2015    Morbid obesity 11/13/2013    Rotator cuff tear, right     s/p Repair    S/P laparoscopic cholecystectomy 08/29/2019       Past Surgical History:        Procedure Laterality Date    ANKLE SURGERY Right 09/2018    R ankle tendon repair    CHOLECYSTECTOMY, LAPAROSCOPIC N/A 08/29/2019    CHOLECYSTECTOMY LAPAROSCOPIC ROBOTIC performed by Darrius Lemon MD at [facility] OR    COLONOSCOPY  05/2021    ERCP N/A 08/26/2019    ERCP STONE REMOVAL performed by Jey Olivia MD at [facility] Endoscopy    ERCP  08/26/2019    ERCP SPHINCTER/PAPILLOTOMY performed by Jey Olivia MD at [facility]    SHOULDER SURGERY Right March 2010, 2011 and 2013    x3, rotator cuff repair x2, and one clean out    TONSILLECTOMY      5 years ago    WISDOM TOOTH EXTRACTION         Social History:    Social History     Tobacco Use    Smoking status: Former     Types: Cigarettes    Smokeless tobacco: Never    Tobacco comments:     pt states 2-3 cigarettes a day   Substance Use Topics

## 2025-03-17 NOTE — PROGRESS NOTES
[facility] progress    S: called to perform arom and internal monitors  O: NSt cat 1      TOCO q2-3      CE 3cm/70/-3 posterior head well applied, pelvis tight  Pitocin at 8  Arom clear and IUPC/FSE placed without difficulty  A/P continue IOL per Dr Blum

## 2025-03-17 NOTE — FLOWSHEET NOTE
Updated Dr. Blum regarding patient status. SVE 3/70/-2, AROM at 0554 and internal fetal monitors placed. Clear fluid, noted bloody show with fluid and cervical fontana. FHTs reactive, contractions every 2 minutes. Reviewed BP values with MD. Continue POC.

## 2025-03-17 NOTE — PROGRESS NOTES
Pt with minimal progress since addi came out. She was 4cm at 3:00PM this afternoon and still 4cm. Recommended recheck at 9PM and if no progression consider C/S. FHT category 1. She is agreeable to the plan. Continue current management.

## 2025-03-17 NOTE — ANESTHESIA PROCEDURE NOTES
Epidural Block    Patient location during procedure: OB  Start time: 3/17/2025 2:59 PM  End time: 3/17/2025 1:13 PM  Reason for block: labor epidural  Staffing  Performed: resident/CRNA   Anesthesiologist: Nando Bundy MD  Resident/CRNA: Gloria Blanc APRN - CRNA  Performed by: Gloria Blanc APRN - CRNA  Authorized by: Gloria Blanc APRN - CRNA    Epidural  Patient position: sitting  Prep: ChloraPrep  Patient monitoring: continuous pulse ox and frequent blood pressure checks  Approach: midline  Location: L3-4  Injection technique: MARCELO saline  Provider prep: mask and sterile gloves  Needle  Needle type: Tuohy   Needle gauge: 20 G  Needle length: 3.5 in  Needle insertion depth: 8 cm  Catheter type: side hole  Catheter size: 20 G  Catheter at skin depth: 13 cm  Test dose: negativeCatheter Secured: tegaderm and tape  Assessment  Hemodynamics: stable  Attempts: 1  Outcomes: uncomplicated and patient tolerated procedure well  Preanesthetic Checklist  Completed: patient identified, IV checked, site marked, risks and benefits discussed, surgical/procedural consents, equipment checked, pre-op evaluation, timeout performed, anesthesia consent given, oxygen available, monitors applied/VS acknowledged, fire risk safety assessment completed and verbalized and blood product R/B/A discussed and consented

## 2025-03-18 LAB
DEPRECATED RDW RBC AUTO: 46.9 FL (ref 35–45)
ERYTHROCYTE [DISTWIDTH] IN BLOOD BY AUTOMATED COUNT: 14 % (ref 11.5–14.5)
HCT VFR BLD AUTO: 27.1 % (ref 37–47)
HGB BLD-MCNC: 9.1 GM/DL (ref 12–16)
MCH RBC QN AUTO: 31.2 PG (ref 26–33)
MCHC RBC AUTO-ENTMCNC: 33.6 GM/DL (ref 32.2–35.5)
MCV RBC AUTO: 92.8 FL (ref 81–99)
PLATELET # BLD AUTO: 221 THOU/MM3 (ref 130–400)
PMV BLD AUTO: 11.5 FL (ref 9.4–12.4)
RBC # BLD AUTO: 2.92 MILL/MM3 (ref 4.2–5.4)
WBC # BLD AUTO: 17.6 THOU/MM3 (ref 4.8–10.8)

## 2025-03-18 PROCEDURE — 2500000003 HC RX 250 WO HCPCS: Performed by: OBSTETRICS & GYNECOLOGY

## 2025-03-18 PROCEDURE — 85027 COMPLETE CBC AUTOMATED: CPT

## 2025-03-18 PROCEDURE — 6370000000 HC RX 637 (ALT 250 FOR IP): Performed by: ANESTHESIOLOGY

## 2025-03-18 PROCEDURE — 6360000002 HC RX W HCPCS: Performed by: OBSTETRICS & GYNECOLOGY

## 2025-03-18 PROCEDURE — 6370000000 HC RX 637 (ALT 250 FOR IP): Performed by: OBSTETRICS & GYNECOLOGY

## 2025-03-18 PROCEDURE — 2580000003 HC RX 258: Performed by: OBSTETRICS & GYNECOLOGY

## 2025-03-18 PROCEDURE — 1200000000 HC SEMI PRIVATE

## 2025-03-18 PROCEDURE — 6360000002 HC RX W HCPCS: Performed by: ANESTHESIOLOGY

## 2025-03-18 PROCEDURE — 36415 COLL VENOUS BLD VENIPUNCTURE: CPT

## 2025-03-18 RX ORDER — SODIUM CHLORIDE 0.9 % (FLUSH) 0.9 %
5-40 SYRINGE (ML) INJECTION PRN
Status: DISCONTINUED | OUTPATIENT
Start: 2025-03-18 | End: 2025-03-19 | Stop reason: HOSPADM

## 2025-03-18 RX ORDER — ONDANSETRON 2 MG/ML
4 INJECTION INTRAMUSCULAR; INTRAVENOUS EVERY 6 HOURS PRN
Status: DISCONTINUED | OUTPATIENT
Start: 2025-03-18 | End: 2025-03-19 | Stop reason: HOSPADM

## 2025-03-18 RX ORDER — SODIUM CHLORIDE, SODIUM LACTATE, POTASSIUM CHLORIDE, CALCIUM CHLORIDE 600; 310; 30; 20 MG/100ML; MG/100ML; MG/100ML; MG/100ML
INJECTION, SOLUTION INTRAVENOUS CONTINUOUS
Status: DISCONTINUED | OUTPATIENT
Start: 2025-03-18 | End: 2025-03-19 | Stop reason: HOSPADM

## 2025-03-18 RX ORDER — SODIUM CHLORIDE 9 MG/ML
INJECTION, SOLUTION INTRAVENOUS PRN
Status: DISCONTINUED | OUTPATIENT
Start: 2025-03-18 | End: 2025-03-19 | Stop reason: HOSPADM

## 2025-03-18 RX ORDER — KETOROLAC TROMETHAMINE 30 MG/ML
30 INJECTION, SOLUTION INTRAMUSCULAR; INTRAVENOUS EVERY 6 HOURS
Status: DISCONTINUED | OUTPATIENT
Start: 2025-03-19 | End: 2025-03-19 | Stop reason: ALTCHOICE

## 2025-03-18 RX ORDER — DOCUSATE SODIUM 100 MG/1
100 CAPSULE, LIQUID FILLED ORAL 2 TIMES DAILY
Status: DISCONTINUED | OUTPATIENT
Start: 2025-03-18 | End: 2025-03-19 | Stop reason: HOSPADM

## 2025-03-18 RX ORDER — IBUPROFEN 800 MG/1
800 TABLET, FILM COATED ORAL EVERY 8 HOURS
Status: DISCONTINUED | OUTPATIENT
Start: 2025-03-20 | End: 2025-03-19 | Stop reason: ALTCHOICE

## 2025-03-18 RX ORDER — IBUPROFEN 800 MG/1
800 TABLET, FILM COATED ORAL EVERY 8 HOURS
Status: DISCONTINUED | OUTPATIENT
Start: 2025-03-19 | End: 2025-03-18

## 2025-03-18 RX ORDER — KETOROLAC TROMETHAMINE 30 MG/ML
30 INJECTION, SOLUTION INTRAMUSCULAR; INTRAVENOUS EVERY 6 HOURS
Status: DISCONTINUED | OUTPATIENT
Start: 2025-03-18 | End: 2025-03-18

## 2025-03-18 RX ORDER — METHYLERGONOVINE MALEATE 0.2 MG/ML
200 INJECTION INTRAVENOUS PRN
Status: DISCONTINUED | OUTPATIENT
Start: 2025-03-18 | End: 2025-03-19 | Stop reason: HOSPADM

## 2025-03-18 RX ORDER — MODIFIED LANOLIN
OINTMENT (GRAM) TOPICAL
Status: DISCONTINUED | OUTPATIENT
Start: 2025-03-18 | End: 2025-03-19 | Stop reason: HOSPADM

## 2025-03-18 RX ORDER — MISOPROSTOL 200 UG/1
1000 TABLET ORAL PRN
Status: DISCONTINUED | OUTPATIENT
Start: 2025-03-18 | End: 2025-03-19 | Stop reason: HOSPADM

## 2025-03-18 RX ORDER — BISACODYL 10 MG
10 SUPPOSITORY, RECTAL RECTAL DAILY PRN
Status: DISCONTINUED | OUTPATIENT
Start: 2025-03-18 | End: 2025-03-19 | Stop reason: HOSPADM

## 2025-03-18 RX ORDER — OXYCODONE HYDROCHLORIDE 5 MG/1
10 TABLET ORAL EVERY 4 HOURS PRN
Status: DISCONTINUED | OUTPATIENT
Start: 2025-03-18 | End: 2025-03-19 | Stop reason: HOSPADM

## 2025-03-18 RX ORDER — ZOLPIDEM TARTRATE 5 MG/1
5 TABLET ORAL NIGHTLY PRN
Status: DISCONTINUED | OUTPATIENT
Start: 2025-03-18 | End: 2025-03-19 | Stop reason: HOSPADM

## 2025-03-18 RX ORDER — DIPHENHYDRAMINE HCL 25 MG
25 TABLET ORAL EVERY 6 HOURS PRN
Status: DISCONTINUED | OUTPATIENT
Start: 2025-03-18 | End: 2025-03-19 | Stop reason: HOSPADM

## 2025-03-18 RX ORDER — KETOROLAC TROMETHAMINE 30 MG/ML
30 INJECTION, SOLUTION INTRAMUSCULAR; INTRAVENOUS EVERY 6 HOURS
Status: DISPENSED | OUTPATIENT
Start: 2025-03-18 | End: 2025-03-19

## 2025-03-18 RX ORDER — ACETAMINOPHEN 500 MG
1000 TABLET ORAL EVERY 8 HOURS SCHEDULED
Status: DISCONTINUED | OUTPATIENT
Start: 2025-03-18 | End: 2025-03-19 | Stop reason: HOSPADM

## 2025-03-18 RX ORDER — NALOXONE HYDROCHLORIDE 0.4 MG/ML
INJECTION, SOLUTION INTRAMUSCULAR; INTRAVENOUS; SUBCUTANEOUS PRN
Status: ACTIVE | OUTPATIENT
Start: 2025-03-18 | End: 2025-03-18

## 2025-03-18 RX ORDER — SODIUM CHLORIDE 0.9 % (FLUSH) 0.9 %
5-40 SYRINGE (ML) INJECTION EVERY 12 HOURS SCHEDULED
Status: DISCONTINUED | OUTPATIENT
Start: 2025-03-18 | End: 2025-03-19 | Stop reason: HOSPADM

## 2025-03-18 RX ORDER — ONDANSETRON 2 MG/ML
4 INJECTION INTRAMUSCULAR; INTRAVENOUS EVERY 6 HOURS PRN
Status: DISPENSED | OUTPATIENT
Start: 2025-03-18 | End: 2025-03-18

## 2025-03-18 RX ORDER — ONDANSETRON 4 MG/1
4 TABLET, ORALLY DISINTEGRATING ORAL EVERY 8 HOURS PRN
Status: DISCONTINUED | OUTPATIENT
Start: 2025-03-18 | End: 2025-03-19 | Stop reason: HOSPADM

## 2025-03-18 RX ORDER — OXYCODONE AND ACETAMINOPHEN 5; 325 MG/1; MG/1
2 TABLET ORAL EVERY 4 HOURS PRN
Status: DISPENSED | OUTPATIENT
Start: 2025-03-18 | End: 2025-03-18

## 2025-03-18 RX ORDER — OXYCODONE HYDROCHLORIDE 5 MG/1
5 TABLET ORAL EVERY 4 HOURS PRN
Status: DISCONTINUED | OUTPATIENT
Start: 2025-03-18 | End: 2025-03-19 | Stop reason: HOSPADM

## 2025-03-18 RX ORDER — ENOXAPARIN SODIUM 100 MG/ML
40 INJECTION SUBCUTANEOUS EVERY 24 HOURS
Status: DISCONTINUED | OUTPATIENT
Start: 2025-03-18 | End: 2025-03-19 | Stop reason: HOSPADM

## 2025-03-18 RX ORDER — ONDANSETRON 4 MG/1
4 TABLET, ORALLY DISINTEGRATING ORAL EVERY 6 HOURS PRN
Status: ACTIVE | OUTPATIENT
Start: 2025-03-18 | End: 2025-03-18

## 2025-03-18 RX ADMIN — DOCUSATE SODIUM 100 MG: 100 CAPSULE, LIQUID FILLED ORAL at 20:30

## 2025-03-18 RX ADMIN — SODIUM CHLORIDE, PRESERVATIVE FREE 10 ML: 5 INJECTION INTRAVENOUS at 09:16

## 2025-03-18 RX ADMIN — DOCUSATE SODIUM 100 MG: 100 CAPSULE, LIQUID FILLED ORAL at 08:41

## 2025-03-18 RX ADMIN — KETOROLAC TROMETHAMINE 30 MG: 30 INJECTION, SOLUTION INTRAMUSCULAR at 08:41

## 2025-03-18 RX ADMIN — OXYCODONE HYDROCHLORIDE AND ACETAMINOPHEN 2 TABLET: 5; 325 TABLET ORAL at 20:30

## 2025-03-18 RX ADMIN — ENOXAPARIN SODIUM 40 MG: 100 INJECTION SUBCUTANEOUS at 21:22

## 2025-03-18 RX ADMIN — KETOROLAC TROMETHAMINE 30 MG: 30 INJECTION, SOLUTION INTRAMUSCULAR at 14:50

## 2025-03-18 RX ADMIN — SODIUM CHLORIDE, SODIUM LACTATE, POTASSIUM CHLORIDE, AND CALCIUM CHLORIDE: .6; .31; .03; .02 INJECTION, SOLUTION INTRAVENOUS at 08:46

## 2025-03-18 RX ADMIN — ONDANSETRON 4 MG: 2 INJECTION INTRAMUSCULAR; INTRAVENOUS at 09:10

## 2025-03-18 RX ADMIN — SODIUM CHLORIDE, POTASSIUM CHLORIDE, SODIUM LACTATE AND CALCIUM CHLORIDE: 600; 310; 30; 20 INJECTION, SOLUTION INTRAVENOUS at 00:52

## 2025-03-18 ASSESSMENT — PAIN - FUNCTIONAL ASSESSMENT
PAIN_FUNCTIONAL_ASSESSMENT: ACTIVITIES ARE NOT PREVENTED

## 2025-03-18 ASSESSMENT — PAIN SCALES - GENERAL
PAINLEVEL_OUTOF10: 2
PAINLEVEL_OUTOF10: 7
PAINLEVEL_OUTOF10: 4
PAINLEVEL_OUTOF10: 5

## 2025-03-18 ASSESSMENT — PAIN DESCRIPTION - ONSET: ONSET: ON-GOING

## 2025-03-18 ASSESSMENT — PAIN DESCRIPTION - DESCRIPTORS
DESCRIPTORS: DISCOMFORT;SORE
DESCRIPTORS: DISCOMFORT
DESCRIPTORS: DISCOMFORT

## 2025-03-18 ASSESSMENT — PAIN DESCRIPTION - FREQUENCY: FREQUENCY: CONTINUOUS

## 2025-03-18 ASSESSMENT — PAIN DESCRIPTION - ORIENTATION
ORIENTATION: LOWER
ORIENTATION: ANTERIOR

## 2025-03-18 ASSESSMENT — PAIN DESCRIPTION - LOCATION
LOCATION: ABDOMEN
LOCATION: ABDOMEN;INCISION
LOCATION: INCISION

## 2025-03-18 ASSESSMENT — PAIN DESCRIPTION - PAIN TYPE: TYPE: ACUTE PAIN;SURGICAL PAIN

## 2025-03-18 NOTE — FLOWSHEET NOTE
Up to BR for first time with assist to void large amount, Pericare instructions given, voices understanding, Fundus firm , midline, U/U after voiding, small amount of bleeding noted. Instructed to call out for help to the bathroom x 1 more, she voiced understanding.

## 2025-03-18 NOTE — LACTATION NOTE
Provided and discussed breastfeeding booklet with pt.  Pt. Stated she has no questions for lactation at this time.

## 2025-03-18 NOTE — FLOWSHEET NOTE
Nix Catheter removed, balloon intact patient tolerated well, instructed patient to call out for help up to the bathroom x2

## 2025-03-18 NOTE — PROGRESS NOTES
Pt unchanged for 6hrs. Recommended C/S for arrest of dilation and she is agreeable. Questions answered .

## 2025-03-18 NOTE — FLOWSHEET NOTE
Dr. Blum returned call to unit. Updated on pt status, FHT, and SVE remaining the same. Orders received for .

## 2025-03-18 NOTE — FLOWSHEET NOTE
Pt requesting something for anxiety pre-op. Dr. Blum paged and order received for Vistaril 25mg PO.

## 2025-03-18 NOTE — FLOWSHEET NOTE
Educated and explained how to use [place] and patient stated she know how to set up and will get started on it soon.

## 2025-03-18 NOTE — OP NOTE
uterus with the aid of  fundal pressure and delivered atraumatically. The rest of the baby delivered.  The cord was clamped and cut and the baby was stimulated.  Cord blood was obtained, the placenta delivered manually and intact. The uterus was cleared of all clots and debris and repaired with an 0 vicryl in a running locked fashion. A second imbricating layer was used for uterine strength.  The abdomen was then copiously irrigated, and hemostasis was assured with Bovie cautery.  The peritoneum was closed with a 3-0 PDS, the fascia was inspected and found to be hemostatic and closed with a Loop 0 PDS. The subcutaneous tissue was irrigated, made hemostatic with Bovie cautery and reapproximated with 3-0 vicryl pops. The skin was closed with 4 0 vicryl suture (s).  Sponge, lap and needle counts were correct x 2. The patient tolerated the procedure well and was taken to recovery room in stable condition     SOLIS Blum M.D.  3/17/2025 10:49 PM

## 2025-03-18 NOTE — FLOWSHEET NOTE
Patient stood at bedside and sat on the side of the bed. Patient tolerated well, slight dizziness noted. Patient laid back down. Encouraged to drink lots of water due to decreased urine output.

## 2025-03-18 NOTE — PROGRESS NOTES
Subjective:     Postpartum Day 1:  Delivery    Doing well    Objective:    VITALS:  /89   Pulse (!) 105   Temp 97.3 °F (36.3 °C) (Oral)   Resp 16   Ht 1.6 m (5' 3\")   Wt 127 kg (280 lb)   SpO2 98%   Breastfeeding Unknown   BMI 49.60 kg/m²   24HR INTAKE/OUTPUT:    Intake/Output Summary (Last 24 hours) at 3/18/2025 1351  Last data filed at 3/18/2025 0600  Gross per 24 hour   Intake 3086.87 ml   Output 2560 ml   Net 526.87 ml       Vitals:    25 1241   BP: 126/89   Pulse: (!) 105   Resp:    Temp: 97.3 °F (36.3 °C)   SpO2: 98%         General:    alert, appears stated age, and cooperative   Bowel Sounds:  active           Incision:  healing well, no significant drainage, no dehiscence, no significant erythema         DATA: CBC   Lab Results   Component Value Date    WBC 17.6 (H) 2025    HGB 9.1 (L) 2025    HCT 27.1 (L) 2025     2025        Assessment:     Status post  section. Doing well postoperatively.     Plan:     Continue current care.      Roselyn Blum MD

## 2025-03-18 NOTE — FLOWSHEET NOTE
Gabriele care completed. Pads and gown changed. Scant bleeding on gabriele pad. Daya dressing in place

## 2025-03-18 NOTE — FLOWSHEET NOTE
Report to Kayli LINK. Patient transported in bed to mother baby unit with baby in arms in stable post partum condition

## 2025-03-18 NOTE — L&D DELIVERY NOTE
Brief Operative Report      Pre-operative Diagnosis:  Term pregnancy, GHTN    Post-operative Diagnosis:  Same    Procedure:  LTCS    Surgeon: SOLIS Blum MD  Assistant: Dr. Hattie FERREIRA     Anesthesia:  Epidural anesthesia    Estimated blood loss:  600cc     Findings: See Operative Dictation, VFI cephalic presentation    Complications:  none      See dictated operative report for full details.      Roselyn Blum MD

## 2025-03-18 NOTE — ANESTHESIA POSTPROCEDURE EVALUATION
Department of Anesthesiology  Postprocedure Note    Patient: Krystal Lanza  MRN: [id number]  YOB: 1995  Date of evaluation: 3/18/2025    Procedure Summary       Date: 25 Room / Location: [address]    Anesthesia Start: 1459 Anesthesia Stop: 224    Procedures:        SECTION (Uterus)      Labor Analgesia Diagnosis:       Labor abnormal      (Labor abnormal [O62.9])    Surgeons: Roselyn Blum MD Responsible Provider: Yonas Pavon DO    Anesthesia Type: epidural ASA Status: 2            Anesthesia Type: No value filed.    Farzad Phase I: Farzad Score: 9    Farzad Phase II: Farzad Score: 10    Anesthesia Post Evaluation    Patient location during evaluation: floor  Patient participation: complete - patient participated  Level of consciousness: awake  Airway patency: patent  Nausea & Vomiting: no vomiting and no nausea  Cardiovascular status: hemodynamically stable  Respiratory status: acceptable  Hydration status: stable  Pain management: adequate        No notable events documented.

## 2025-03-18 NOTE — FLOWSHEET NOTE
Spoke with Isabelle in pharmacy regarding Lovenox dose. Isabelle spoke with Dr Blum and the Lovenox will be held and started at 24 hours at 2230 tonight.

## 2025-03-18 NOTE — FLOWSHEET NOTE
Report received from Gloria LINK. Pt transferred to [facility] via bed with infant in arms and s/o at side. Pt and infant in stable condition. Pt oriented to room and call light and admission started.

## 2025-03-19 VITALS
HEART RATE: 97 BPM | TEMPERATURE: 98.1 F | BODY MASS INDEX: 49.61 KG/M2 | HEIGHT: 63 IN | SYSTOLIC BLOOD PRESSURE: 139 MMHG | OXYGEN SATURATION: 99 % | DIASTOLIC BLOOD PRESSURE: 83 MMHG | WEIGHT: 280 LBS | RESPIRATION RATE: 18 BRPM

## 2025-03-19 PROCEDURE — 6370000000 HC RX 637 (ALT 250 FOR IP): Performed by: OBSTETRICS & GYNECOLOGY

## 2025-03-19 RX ORDER — IBUPROFEN 800 MG/1
800 TABLET, FILM COATED ORAL EVERY 8 HOURS
Status: DISCONTINUED | OUTPATIENT
Start: 2025-03-19 | End: 2025-03-19 | Stop reason: HOSPADM

## 2025-03-19 RX ORDER — OXYCODONE HYDROCHLORIDE 5 MG/1
5 TABLET ORAL EVERY 6 HOURS PRN
Qty: 28 TABLET | Refills: 0 | Status: SHIPPED | OUTPATIENT
Start: 2025-03-19 | End: 2025-03-26

## 2025-03-19 RX ORDER — PSEUDOEPHEDRINE HCL 30 MG
100 TABLET ORAL 2 TIMES DAILY PRN
COMMUNITY
Start: 2025-03-19

## 2025-03-19 RX ORDER — IBUPROFEN 200 MG
800 TABLET ORAL EVERY 8 HOURS PRN
COMMUNITY
Start: 2025-03-19

## 2025-03-19 RX ADMIN — ACETAMINOPHEN 1000 MG: 500 TABLET ORAL at 00:48

## 2025-03-19 RX ADMIN — OXYCODONE HYDROCHLORIDE 10 MG: 5 TABLET ORAL at 00:48

## 2025-03-19 RX ADMIN — DOCUSATE SODIUM 100 MG: 100 CAPSULE, LIQUID FILLED ORAL at 08:57

## 2025-03-19 RX ADMIN — ACETAMINOPHEN 1000 MG: 500 TABLET ORAL at 08:56

## 2025-03-19 RX ADMIN — OXYCODONE HYDROCHLORIDE 10 MG: 5 TABLET ORAL at 08:57

## 2025-03-19 RX ADMIN — OXYCODONE HYDROCHLORIDE 10 MG: 5 TABLET ORAL at 04:29

## 2025-03-19 RX ADMIN — IBUPROFEN 800 MG: 800 TABLET, FILM COATED ORAL at 04:28

## 2025-03-19 ASSESSMENT — PAIN DESCRIPTION - DESCRIPTORS
DESCRIPTORS: DISCOMFORT
DESCRIPTORS: DISCOMFORT
DESCRIPTORS: DISCOMFORT;CRAMPING

## 2025-03-19 ASSESSMENT — PAIN DESCRIPTION - ORIENTATION
ORIENTATION: ANTERIOR;MID
ORIENTATION: LOWER
ORIENTATION: ANTERIOR;MID

## 2025-03-19 ASSESSMENT — PAIN DESCRIPTION - FREQUENCY
FREQUENCY: INTERMITTENT
FREQUENCY: INTERMITTENT

## 2025-03-19 ASSESSMENT — PAIN SCALES - GENERAL
PAINLEVEL_OUTOF10: 7
PAINLEVEL_OUTOF10: 5
PAINLEVEL_OUTOF10: 3
PAINLEVEL_OUTOF10: 4
PAINLEVEL_OUTOF10: 7

## 2025-03-19 ASSESSMENT — PAIN DESCRIPTION - LOCATION
LOCATION: ABDOMEN
LOCATION: ABDOMEN
LOCATION: ABDOMEN;INCISION

## 2025-03-19 ASSESSMENT — PAIN DESCRIPTION - ONSET
ONSET: ON-GOING
ONSET: ON-GOING

## 2025-03-19 ASSESSMENT — PAIN DESCRIPTION - PAIN TYPE
TYPE: ACUTE PAIN;SURGICAL PAIN
TYPE: ACUTE PAIN;SURGICAL PAIN

## 2025-03-19 ASSESSMENT — PAIN - FUNCTIONAL ASSESSMENT
PAIN_FUNCTIONAL_ASSESSMENT: ACTIVITIES ARE NOT PREVENTED

## 2025-03-19 NOTE — DISCHARGE SUMMARY
Obstetrical Discharge Form      Pt Name: Krystal Lanza  MRN: [id number] Acct #: [id number]  YOB: 1995      Admitting Diagnosis  IUP  OB History          1    Para   1    Term   1            AB        Living   1         SAB        IAB        Ectopic        Molar        Multiple   0    Live Births   1                Reasons for Admission on 3/16/2025  6:03 PM  Encounter for induction of labor [Z34.90]  No comment available  C section.      Intrapartum Procedures        Multiple birth?: No                 Postpartum/Operative Complications       [place] Data  Information for the patient's :  Myla, Girl Krystal \"Parsons\" [[id number]]   female   Birth Weight: 3.18 kg (7 lb 0.2 oz)    Discharge Diagnosis     Status post .    Discharge Information  Current Discharge Medication List        START taking these medications    Details   ibuprofen (ADVIL;MOTRIN) 200 MG tablet Take 4 tablets by mouth every 8 hours as needed for Pain      docusate sodium (COLACE, DULCOLAX) 100 MG CAPS Take 100 mg by mouth 2 times daily as needed for Constipation      oxyCODONE (ROXICODONE) 5 MG immediate release tablet Take 1 tablet by mouth every 6 hours as needed for Pain for up to 7 days. Intended supply: 7 days. Take lowest dose possible to manage pain Max Daily Amount: 20 mg  Qty: 28 tablet, Refills: 0    Comments: Reduce doses taken as pain becomes manageable  Associated Diagnoses:  delivery delivered           CONTINUE these medications which have NOT CHANGED    Details   Prenatal MV-Min-Fe Fum-FA-DHA (PRENATAL 1 PO) Take 1 tablet by mouth daily      Magnesium Hydroxide (DULCOLAX PO) Take 1 tablet by mouth daily      Ferrous Sulfate (IRON PO) Take 1 tablet by mouth every other day      albuterol sulfate HFA (PROVENTIL HFA) 108 (90 Base) MCG/ACT inhaler Inhale 2 puffs into the lungs every 4 hours as needed for Wheezing or Shortness of Breath  Qty: 1 each, Refills: 3    Associated Diagnoses:

## 2025-03-19 NOTE — LACTATION NOTE
Met with pt in room.  Educated about flange size and pump parts.  Reviewed breast gymnastics.  Offered support prn.

## 2025-03-19 NOTE — PLAN OF CARE
Problem: Cardiovascular - Adult  Goal: Maintains optimal cardiac output and hemodynamic stability  Description: INTERVENTIONS:.  -Monitor blood pressure and heart rate  -Monitor urine output and notify licensed practitioner for values outside of   -Administer fluid and /or volume expanders as ordered  -Administer vasoactive or antifibrinolytic medications as ordered  -Monitor labs and assess for signs and symptoms of volume excess or deficit  -Monitor response to interventions for patient's volume status, including labs, urine output  -Assess for signs and symptoms of bleeding or hemorrhage  -Monitor labs for bleeding or clotting disorders as ordered  Administer blood products/factors as ordered    3/18/2025 2213 by Marsha Dixon, RN  Outcome: Progressing  Flowsheets (Taken 3/18/2025 2030)  Maintains optimal cardiac output and hemodynamic stability: Monitor blood pressure and heart rate  3/18/2025 1426 by Desi Gr, RN  Outcome: Progressing  Flowsheets (Taken 3/18/2025 0841)  Maintains optimal cardiac output and hemodynamic stability:   Monitor blood pressure and heart rate   Monitor urine output and notify Licensed Independent Practitioner for values outside of normal range     Problem: Respiratory - Adult  Goal: Achieves optimal ventilation and oxygenation  Description: INTERVENTIONS:.  -Assess for changes in respiratory status   -Assess for changes in mentation and behavior  -Position to facilitate oxygenation and minimize respiratory effort  -Oxygen supplementation based on oxygen saturation or arterial blood gases  -Assess and instruct to report shortness of breath or any respiratory difficulty  -Respiratory therapy support as indicated      3/18/2025 2213 by Marsha Dixon, RN  Outcome: Progressing  Flowsheets (Taken 3/18/2025 0841 by Desi Gr, RN)  Achieves optimal ventilation and oxygenation:   Assess for changes in respiratory status   Assess for changes in mentation and 
  Problem: Cardiovascular - Adult  Goal: Maintains optimal cardiac output and hemodynamic stability  Description: INTERVENTIONS:.  -Monitor blood pressure and heart rate  -Monitor urine output and notify licensed practitioner for values outside of   -Administer fluid and /or volume expanders as ordered  -Administer vasoactive or antifibrinolytic medications as ordered  -Monitor labs and assess for signs and symptoms of volume excess or deficit  -Monitor response to interventions for patient's volume status, including labs, urine output  -Assess for signs and symptoms of bleeding or hemorrhage  -Monitor labs for bleeding or clotting disorders as ordered  Administer blood products/factors as ordered    3/19/2025 1138 by Fern Huntley, RN  Outcome: Progressing  Flowsheets (Taken 3/19/2025 0855)  Maintains optimal cardiac output and hemodynamic stability: Monitor blood pressure and heart rate     Problem: Respiratory - Adult  Goal: Achieves optimal ventilation and oxygenation  Description: INTERVENTIONS:.  -Assess for changes in respiratory status   -Assess for changes in mentation and behavior  -Position to facilitate oxygenation and minimize respiratory effort  -Oxygen supplementation based on oxygen saturation or arterial blood gases  -Assess and instruct to report shortness of breath or any respiratory difficulty  -Respiratory therapy support as indicated      3/19/2025 1138 by Fern Huntley, RN  Outcome: Progressing  Flowsheets (Taken 3/19/2025 0855)  Achieves optimal ventilation and oxygenation: Assess for changes in respiratory status     Problem: Coping  Goal: Pt/Family able to verbalize concerns and demonstrate effective coping strategies  Description: INTERVENTIONS:  1. Assist patient/family to identify coping skills, available support systems and cultural and spiritual values  2. Provide emotional support, including active listening and acknowledgement of concerns of patient and caregivers  3. 
  Problem: Pain  Goal: Verbalizes/displays adequate comfort level or baseline comfort level  Recent Flowsheet Documentation  Taken 3/18/2025 1240 by Desi Gr RN  Verbalizes/displays adequate comfort level or baseline comfort level:   Encourage patient to monitor pain and request assistance   Assess pain using appropriate pain scale  Taken 3/18/2025 0841 by Desi Gr RN  Verbalizes/displays adequate comfort level or baseline comfort level:   Encourage patient to monitor pain and request assistance   Assess pain using appropriate pain scale  Taken 3/18/2025 0335 by Kayli Norris RN  Verbalizes/displays adequate comfort level or baseline comfort level:   Encourage patient to monitor pain and request assistance   Administer analgesics based on type and severity of pain and evaluate response     Problem: Infection - Adult  Goal: Absence of infection during hospitalization  Recent Flowsheet Documentation  Taken 3/18/2025 1240 by Desi Gr RN  Absence of infection during hospitalization:   Assess and monitor for signs and symptoms of infection   Monitor lab/diagnostic results  Taken 3/18/2025 0841 by Desi Gr RN  Absence of infection during hospitalization:   Assess and monitor for signs and symptoms of infection   Monitor lab/diagnostic results  Taken 3/18/2025 0335 by Kayli Norris RN  Absence of infection during hospitalization:   Assess and monitor for signs and symptoms of infection   Monitor all insertion sites i.e., indwelling lines, tubes and drains     Problem: Safety - Adult  Goal: Free from fall injury  Recent Flowsheet Documentation  Taken 3/18/2025 1240 by Desi Gr RN  Free From Fall Injury: Instruct family/caregiver on patient safety  Taken 3/18/2025 0841 by Desi Gr RN  Free From Fall Injury: Instruct family/caregiver on patient safety  Taken 3/18/2025 0335 by Kayli Norris RN  Free From Fall Injury:   Instruct family/caregiver on patient 
  Problem: Vaginal Birth or  Section  Goal: Fetal and maternal status remain reassuring during the birth process  Description:  Birth OB-Pregnancy care plan goal which identifies if the fetal and maternal status remain reassuring during the birth process  3/17/2025 1109 by Tiera Arrington RN  Outcome: Progressing  3/16/2025 2119 by Korian Byrd RN  Outcome: Progressing  Flowsheets (Taken 3/16/2025 2119)  Fetal and Maternal Status Remain Reassuring During the Birth Process:   Monitor vital signs   Monitor labor progression (Vaginal delivery)   Monitor fetal heart rate   Monitor uterine activity     Problem: Pain  Goal: Verbalizes/displays adequate comfort level or baseline comfort level  3/17/2025 110 by Tiera Arrington RN  Outcome: Progressing  Flowsheets (Taken 3/17/2025 1024)  Verbalizes/displays adequate comfort level or baseline comfort level:   Encourage patient to monitor pain and request assistance   Assess pain using appropriate pain scale   Administer analgesics based on type and severity of pain and evaluate response   Consider cultural and social influences on pain and pain management   Implement non-pharmacological measures as appropriate and evaluate response   Notify Licensed Independent Practitioner if interventions unsuccessful or patient reports new pain  3/16/2025 2119 by Korina Byrd RN  Outcome: Progressing  Flowsheets (Taken 3/16/2025 2119)  Verbalizes/displays adequate comfort level or baseline comfort level:   Encourage patient to monitor pain and request assistance   Administer analgesics based on type and severity of pain and evaluate response   Consider cultural and social influences on pain and pain management   Assess pain using appropriate pain scale   Implement non-pharmacological measures as appropriate and evaluate response   Notify Licensed Independent Practitioner if interventions unsuccessful or patient reports new pain     Problem: Infection - Adult  Goal: Absence of 
  Problem: Vaginal Birth or  Section  Goal: Fetal and maternal status remain reassuring during the birth process  Description:  Birth OB-Pregnancy care plan goal which identifies if the fetal and maternal status remain reassuring during the birth process  3/17/2025 1252 by Marsha Mcgregor RN  Outcome: Progressing  Flowsheets (Taken 3/17/2025 1252)  Fetal and Maternal Status Remain Reassuring During the Birth Process:   Monitor vital signs   Monitor uterine activity   Monitor fetal heart rate   Monitor labor progression (Vaginal delivery)     Problem: Pain  Goal: Verbalizes/displays adequate comfort level or baseline comfort level  3/17/2025 1252 by Marsha Mcgregor RN  Outcome: Progressing  Flowsheets (Taken 3/17/2025 1252)  Verbalizes/displays adequate comfort level or baseline comfort level:   Encourage patient to monitor pain and request assistance   Assess pain using appropriate pain scale   Implement non-pharmacological measures as appropriate and evaluate response     Problem: Infection - Adult  Goal: Absence of infection during hospitalization  3/17/2025 1252 by Marsha Mcgregor RN  Outcome: Progressing  Flowsheets (Taken 3/17/2025 1252)  Absence of infection during hospitalization:   Assess and monitor for signs and symptoms of infection   Instruct and encourage patient and family to use good hand hygiene technique     Problem: Safety - Adult  Goal: Free from fall injury  3/17/2025 1252 by Marsha Mcgregor RN  Outcome: Progressing  Flowsheets (Taken 3/17/2025 1252)  Free From Fall Injury:   Instruct family/caregiver on patient safety   Based on caregiver fall risk screen, instruct family/caregiver to ask for assistance with transferring infant if caregiver noted to have fall risk factors     Problem: Discharge Planning  Goal: Discharge to home or other facility with appropriate resources  3/17/2025 1252 by Marsha Mcgregor RN  Outcome: Progressing  Flowsheets (Taken 3/17/2025 1252)  Discharge to 
  Problem: Vaginal Birth or  Section  Goal: Fetal and maternal status remain reassuring during the birth process  Description:  Birth OB-Pregnancy care plan goal which identifies if the fetal and maternal status remain reassuring during the birth process  Outcome: Progressing  Flowsheets (Taken 3/16/2025 2119)  Fetal and Maternal Status Remain Reassuring During the Birth Process:   Monitor vital signs   Monitor labor progression (Vaginal delivery)   Monitor fetal heart rate   Monitor uterine activity     Problem: Pain  Goal: Verbalizes/displays adequate comfort level or baseline comfort level  Outcome: Progressing  Flowsheets (Taken 3/16/2025 2119)  Verbalizes/displays adequate comfort level or baseline comfort level:   Encourage patient to monitor pain and request assistance   Administer analgesics based on type and severity of pain and evaluate response   Consider cultural and social influences on pain and pain management   Assess pain using appropriate pain scale   Implement non-pharmacological measures as appropriate and evaluate response   Notify Licensed Independent Practitioner if interventions unsuccessful or patient reports new pain     Problem: Infection - Adult  Goal: Absence of infection during hospitalization  Outcome: Progressing  Flowsheets (Taken 3/16/2025 2119)  Absence of infection during hospitalization:   Assess and monitor for signs and symptoms of infection   Monitor all insertion sites i.e., indwelling lines, tubes and drains   Monitor lab/diagnostic results   Instruct and encourage patient and family to use good hand hygiene technique     Problem: Safety - Adult  Goal: Free from fall injury  Outcome: Progressing  Flowsheets (Taken 3/16/2025 2119)  Free From Fall Injury:   Instruct family/caregiver on patient safety   Based on caregiver fall risk screen, instruct family/caregiver to ask for assistance with transferring infant if caregiver noted to have fall risk factors     Problem: 
Marsha MCKEON RN  Outcome: Progressing  Flowsheets (Taken 3/17/2025 1252)  Absence of infection during hospitalization:   Assess and monitor for signs and symptoms of infection   Instruct and encourage patient and family to use good hand hygiene technique  3/17/2025 1109 by Tiera Arrington RN  Outcome: Progressing     Problem: Safety - Adult  Goal: Free from fall injury  3/17/2025 1918 by Christina Kim, RN  Outcome: Completed  3/17/2025 1252 by Marsha Mcgregor RN  Outcome: Progressing  Flowsheets (Taken 3/17/2025 1252)  Free From Fall Injury:   Instruct family/caregiver on patient safety   Based on caregiver fall risk screen, instruct family/caregiver to ask for assistance with transferring infant if caregiver noted to have fall risk factors  3/17/2025 1109 by Tiera Arrington RN  Outcome: Progressing     Problem: Discharge Planning  Goal: Discharge to home or other facility with appropriate resources  3/17/2025 1918 by Christina Kim RN  Outcome: Completed  3/17/2025 1252 by Marsha Mcgregor RN  Outcome: Progressing  Flowsheets (Taken 3/17/2025 1252)  Discharge to home or other facility with appropriate resources: Refer to discharge planning if patient needs post-hospital services based on physician order or complex needs related to functional status, cognitive ability or social support system  3/17/2025 1109 by Tiera Arrington RN  Outcome: Progressing     Problem: Chronic Conditions and Co-morbidities  Goal: Patient's chronic conditions and co-morbidity symptoms are monitored and maintained or improved  3/17/2025 1918 by Christina Kim RN  Outcome: Completed  3/17/2025 1252 by Marsha Mcgregor RN  Outcome: Progressing  Flowsheets (Taken 3/17/2025 1252)  Care Plan - Patient's Chronic Conditions and Co-Morbidity Symptoms are Monitored and Maintained or Improved: Monitor and assess patient's chronic conditions and comorbid symptoms for stability, deterioration, or improvement  Note: Pt has increased BP's 
of care.

## 2025-03-19 NOTE — PROGRESS NOTES
Subjective:     Postpartum Day 2:  Delivery    Doing well, BPs controlled.     Objective:    VITALS:  /73   Pulse (!) 103   Temp 98.7 °F (37.1 °C) (Oral)   Resp 16   Ht 1.6 m (5' 3\")   Wt 127 kg (280 lb)   SpO2 98%   Breastfeeding Unknown   BMI 49.60 kg/m²     Vitals:    25 0428   BP: 112/73   Pulse: (!) 103   Resp:    Temp:    SpO2:          General:    alert, appears stated age, and cooperative   Bowel Sounds:  active           Incision:  healing well, no significant drainage, no dehiscence, no significant erythema         DATA: CBC   Lab Results   Component Value Date    WBC 17.6 (H) 2025    HGB 9.1 (L) 2025    HCT 27.1 (L) 2025     2025        Assessment:     Status post  section. Doing well postoperatively.     Plan:     Continue current care.      Roselyn Blum MD

## 2025-03-19 NOTE — FLOWSHEET NOTE
Discharge prescriptions and instructions given on use and side effects. See AVS. Pt verbalized understanding of medications. Postpartum  teaching completed.  Copy given to patient. Patient verbalized understanding of all teaching points. Patient plans to follow-up with OB Provider as instructed. Patient verbalizes understanding of discharge instructions and denies further questions. ID bands checked. Patient discharged in stable condition accompanied by family/guardian. Discharged in wheelchair, holding baby in arms.

## 2025-03-19 NOTE — FLOWSHEET NOTE
Post birth warning signs education paper given and reviewed, teaching complete. [place] postpartum depression screening discussed with patient, instructed to contact her healthcare provider if her score is > 10. Patient voiced understanding.  Mother's blood type is A+.  Baby's blood type is N/A.

## 2025-03-19 NOTE — FLOWSHEET NOTE
[employer]- Postpartum and [place] Teaching / Discharge check lists completed and signed by mother/patient/ guardian.

## 2025-03-19 NOTE — DISCHARGE INSTRUCTIONS
After Your  Delivery Discharge Instructions  [facility]  [phone]  0 [address] [address]    After Discharge Orders:  No future appointments.   [unfilled]     Medical equipment: none     Call the Physician with any  signs and symptoms:    Warning signs regarding incision:  \"Popping\" of stitches or staples  Foul smelling discharge or pus  More redness or streaks around incision than before    Incision care:  Keep incision dry and covered (if necessary)  No tub baths until OK'd by your Physician or Midwife  You may use cold compresses on incision site     After your delivery - signs and symptoms to watch for:  Fever - Oral temperature greater than 100.4 degrees Fahrenheit  Foul-smelling vaginal discharge  Headache unrelieved by \"pain meds\"  Difficulty urinating  Breasts reddened, hard, hot to the touch  Nipple discharge which is foul-smelling or contains pus  Increased pain at the site of the surgical incision  Difficulty breathing with or without chest pain  New calf pain especially if only on one side  Sudden, continuing increased vaginal bleeding with or without clots  Unrelieved feelings of:  Inability to cope  Sadness  Anxiety  Lack of interest in baby  Insomnia  Crying     What to do at home:  See patient education handouts for full information  Resume activity gradually   Don't lift anything heavier than baby and carrier until OK'd by your Physician  No sex until OK'd by your Physician or Midwife  Take care of yourself by sleeping/resting as much as possible  Eat regular nutritious meals  Let someone else care for you, your baby, and housework as much as possible   Take pain medication as prescribed whenever you need them  Wear compression stockings if prescribed   To avoid/relieve constipation take stool softeners if advised   Drink lots of water/fruit juices  Increase fiber in your diet  Breast care: Wear support bra ; use lanolin

## 2025-04-16 ENCOUNTER — PATIENT MESSAGE (OUTPATIENT)
Dept: FAMILY MEDICINE CLINIC | Age: 30
End: 2025-04-16

## 2025-04-16 DIAGNOSIS — M62.838 MUSCLE SPASM: ICD-10-CM

## 2025-04-16 DIAGNOSIS — G43.009 MIGRAINE WITHOUT AURA AND WITHOUT STATUS MIGRAINOSUS, NOT INTRACTABLE: ICD-10-CM

## 2025-04-16 RX ORDER — ONDANSETRON 4 MG/1
4 TABLET, FILM COATED ORAL EVERY 8 HOURS PRN
Qty: 30 TABLET | Refills: 2 | Status: SHIPPED | OUTPATIENT
Start: 2025-04-16

## 2025-04-16 RX ORDER — CYCLOBENZAPRINE HCL 5 MG
5-10 TABLET ORAL EVERY 8 HOURS PRN
Qty: 60 TABLET | Refills: 3 | Status: SHIPPED | OUTPATIENT
Start: 2025-04-16

## 2025-06-10 ASSESSMENT — PATIENT HEALTH QUESTIONNAIRE - PHQ9
7. TROUBLE CONCENTRATING ON THINGS, SUCH AS READING THE NEWSPAPER OR WATCHING TELEVISION: NOT AT ALL
3. TROUBLE FALLING OR STAYING ASLEEP: NOT AT ALL
1. LITTLE INTEREST OR PLEASURE IN DOING THINGS: NOT AT ALL
8. MOVING OR SPEAKING SO SLOWLY THAT OTHER PEOPLE COULD HAVE NOTICED. OR THE OPPOSITE - BEING SO FIDGETY OR RESTLESS THAT YOU HAVE BEEN MOVING AROUND A LOT MORE THAN USUAL: NOT AT ALL
3. TROUBLE FALLING OR STAYING ASLEEP: NOT AT ALL
SUM OF ALL RESPONSES TO PHQ QUESTIONS 1-9: 1
8. MOVING OR SPEAKING SO SLOWLY THAT OTHER PEOPLE COULD HAVE NOTICED. OR THE OPPOSITE, BEING SO FIGETY OR RESTLESS THAT YOU HAVE BEEN MOVING AROUND A LOT MORE THAN USUAL: NOT AT ALL
10. IF YOU CHECKED OFF ANY PROBLEMS, HOW DIFFICULT HAVE THESE PROBLEMS MADE IT FOR YOU TO DO YOUR WORK, TAKE CARE OF THINGS AT HOME, OR GET ALONG WITH OTHER PEOPLE: NOT DIFFICULT AT ALL
6. FEELING BAD ABOUT YOURSELF - OR THAT YOU ARE A FAILURE OR HAVE LET YOURSELF OR YOUR FAMILY DOWN: NOT AT ALL
5. POOR APPETITE OR OVEREATING: NOT AT ALL
6. FEELING BAD ABOUT YOURSELF - OR THAT YOU ARE A FAILURE OR HAVE LET YOURSELF OR YOUR FAMILY DOWN: NOT AT ALL
SUM OF ALL RESPONSES TO PHQ QUESTIONS 1-9: 1
10. IF YOU CHECKED OFF ANY PROBLEMS, HOW DIFFICULT HAVE THESE PROBLEMS MADE IT FOR YOU TO DO YOUR WORK, TAKE CARE OF THINGS AT HOME, OR GET ALONG WITH OTHER PEOPLE: NOT DIFFICULT AT ALL
9. THOUGHTS THAT YOU WOULD BE BETTER OFF DEAD, OR OF HURTING YOURSELF: NOT AT ALL
7. TROUBLE CONCENTRATING ON THINGS, SUCH AS READING THE NEWSPAPER OR WATCHING TELEVISION: NOT AT ALL
5. POOR APPETITE OR OVEREATING: NOT AT ALL
2. FEELING DOWN, DEPRESSED OR HOPELESS: NOT AT ALL
SUM OF ALL RESPONSES TO PHQ QUESTIONS 1-9: 1
SUM OF ALL RESPONSES TO PHQ QUESTIONS 1-9: 1
4. FEELING TIRED OR HAVING LITTLE ENERGY: SEVERAL DAYS
2. FEELING DOWN, DEPRESSED OR HOPELESS: NOT AT ALL
SUM OF ALL RESPONSES TO PHQ QUESTIONS 1-9: 1
1. LITTLE INTEREST OR PLEASURE IN DOING THINGS: NOT AT ALL
9. THOUGHTS THAT YOU WOULD BE BETTER OFF DEAD, OR OF HURTING YOURSELF: NOT AT ALL
4. FEELING TIRED OR HAVING LITTLE ENERGY: SEVERAL DAYS

## 2025-06-13 ENCOUNTER — LAB (OUTPATIENT)
Dept: LAB | Age: 30
End: 2025-06-13

## 2025-06-13 ENCOUNTER — OFFICE VISIT (OUTPATIENT)
Dept: FAMILY MEDICINE CLINIC | Age: 30
End: 2025-06-13

## 2025-06-13 VITALS
HEIGHT: 63 IN | TEMPERATURE: 97.9 F | RESPIRATION RATE: 16 BRPM | HEART RATE: 103 BPM | WEIGHT: 250.2 LBS | OXYGEN SATURATION: 98 % | DIASTOLIC BLOOD PRESSURE: 80 MMHG | BODY MASS INDEX: 44.33 KG/M2 | SYSTOLIC BLOOD PRESSURE: 132 MMHG

## 2025-06-13 DIAGNOSIS — I95.1 ORTHOSTATIC HYPOTENSION: Primary | ICD-10-CM

## 2025-06-13 DIAGNOSIS — R55 NEAR SYNCOPE: ICD-10-CM

## 2025-06-13 DIAGNOSIS — I95.1 ORTHOSTATIC HYPOTENSION: ICD-10-CM

## 2025-06-13 DIAGNOSIS — R06.09 DOE (DYSPNEA ON EXERTION): ICD-10-CM

## 2025-06-13 DIAGNOSIS — F90.0 ATTENTION DEFICIT HYPERACTIVITY DISORDER (ADHD), PREDOMINANTLY INATTENTIVE TYPE: ICD-10-CM

## 2025-06-13 PROBLEM — Z34.90 ENCOUNTER FOR INDUCTION OF LABOR: Status: RESOLVED | Noted: 2025-03-16 | Resolved: 2025-06-13

## 2025-06-13 LAB
ALBUMIN SERPL BCG-MCNC: 4.2 G/DL (ref 3.4–4.9)
ALP SERPL-CCNC: 104 U/L (ref 38–126)
ALT SERPL W/O P-5'-P-CCNC: 93 U/L (ref 10–35)
ANION GAP SERPL CALC-SCNC: 11 MEQ/L (ref 8–16)
AST SERPL-CCNC: 46 U/L (ref 10–35)
BASOPHILS ABSOLUTE: 0.1 THOU/MM3 (ref 0–0.1)
BASOPHILS NFR BLD AUTO: 1.1 %
BILIRUB SERPL-MCNC: 1 MG/DL (ref 0.3–1.2)
BUN SERPL-MCNC: 16 MG/DL (ref 8–23)
CALCIUM SERPL-MCNC: 11 MG/DL (ref 8.6–10)
CHLORIDE SERPL-SCNC: 108 MEQ/L (ref 98–111)
CO2 SERPL-SCNC: 24 MEQ/L (ref 22–29)
CREAT SERPL-MCNC: 0.7 MG/DL (ref 0.5–0.9)
DEPRECATED RDW RBC AUTO: 44.9 FL (ref 35–45)
EOSINOPHIL NFR BLD AUTO: 1.4 %
EOSINOPHILS ABSOLUTE: 0.1 THOU/MM3 (ref 0–0.4)
ERYTHROCYTE [DISTWIDTH] IN BLOOD BY AUTOMATED COUNT: 13.7 % (ref 11.5–14.5)
GFR SERPL CREATININE-BSD FRML MDRD: > 90 ML/MIN/1.73M2
GLUCOSE SERPL-MCNC: 89 MG/DL (ref 74–109)
HCT VFR BLD AUTO: 40.5 % (ref 37–47)
HGB BLD-MCNC: 12.7 GM/DL (ref 12–16)
IMM GRANULOCYTES # BLD AUTO: 0.02 THOU/MM3 (ref 0–0.07)
IMM GRANULOCYTES NFR BLD AUTO: 0.3 %
LYMPHOCYTES ABSOLUTE: 1.8 THOU/MM3 (ref 1–4.8)
LYMPHOCYTES NFR BLD AUTO: 29.2 %
MCH RBC QN AUTO: 28 PG (ref 26–33)
MCHC RBC AUTO-ENTMCNC: 31.4 GM/DL (ref 32.2–35.5)
MCV RBC AUTO: 89.2 FL (ref 81–99)
MONOCYTES ABSOLUTE: 0.6 THOU/MM3 (ref 0.4–1.3)
MONOCYTES NFR BLD AUTO: 9.9 %
NEUTROPHILS ABSOLUTE: 3.7 THOU/MM3 (ref 1.8–7.7)
NEUTROPHILS NFR BLD AUTO: 58.1 %
NRBC BLD AUTO-RTO: 0 /100 WBC
PLATELET # BLD AUTO: 300 THOU/MM3 (ref 130–400)
PMV BLD AUTO: 11.8 FL (ref 9.4–12.4)
POTASSIUM SERPL-SCNC: 4.3 MEQ/L (ref 3.5–5.2)
PROT SERPL-MCNC: 6.8 G/DL (ref 6.4–8.3)
RBC # BLD AUTO: 4.54 MILL/MM3 (ref 4.2–5.4)
SODIUM SERPL-SCNC: 143 MEQ/L (ref 135–145)
T4 FREE SERPL-MCNC: 2.2 NG/DL (ref 0.92–1.68)
TSH SERPL DL<=0.05 MIU/L-ACNC: 0.01 UIU/ML (ref 0.27–4.2)
WBC # BLD AUTO: 6.3 THOU/MM3 (ref 4.8–10.8)

## 2025-06-13 RX ORDER — NIFEDIPINE 90 MG/1
90 TABLET, EXTENDED RELEASE ORAL DAILY
COMMUNITY
End: 2025-06-13

## 2025-06-13 NOTE — PATIENT INSTRUCTIONS
LAB INSTRUCTIONS:    Please complete labs within 4 week(s).    Please fast for 8 hours prior to lab collection.    The clinic will call you within 1 week of collection. If you have not heard from us within that amount of time, please call us at [phone].

## 2025-06-13 NOTE — PROGRESS NOTES
reassuring  Exam unrevealing    Plan:  Labs below  Echo  F/u 6 wks  Reviewed ER precautions, pt understands.     - CBC with Auto Differential; Future  - Comprehensive Metabolic Panel; Future  - TSH reflex to FT4; Future  - EKG 12 lead  - Echo (TTE) complete (PRN contrast/bubble/strain/3D); Future    2. Near syncope    - Echo (TTE) complete (PRN contrast/bubble/strain/3D); Future    3. GAGE (dyspnea on exertion)    - Echo (TTE) complete (PRN contrast/bubble/strain/3D); Future    4. Attention deficit hyperactivity disorder (ADHD), predominantly inattentive type    Stable off Adderall XR  Plans to likely resume once back to work.       DISPOSITION    Return in about 5 weeks (around 7/18/2025) for f/u dizziness etc, sooner as needed.    Krystal released without restrictions.    PATIENT COUNSELING    Barriers to learning and self management: none    Discussed use, benefit, and side effects of prescribed medications.  Barriers to medication compliance addressed.  All patient questions answered.  Pt voiced understanding.       Electronically signed by Ceasar Jung DO on 6/13/2025 at 10:28 AM

## 2025-06-16 ENCOUNTER — RESULTS FOLLOW-UP (OUTPATIENT)
Dept: FAMILY MEDICINE CLINIC | Age: 30
End: 2025-06-16

## 2025-06-16 DIAGNOSIS — E05.90 HYPERTHYROIDISM: Primary | ICD-10-CM

## 2025-06-16 NOTE — TELEPHONE ENCOUNTER
Pt informed of lab results. Pt voiced understanding with no further questions at this time.       She will go to [facility] to get them done.

## 2025-06-17 ENCOUNTER — LAB (OUTPATIENT)
Dept: LAB | Age: 30
End: 2025-06-17

## 2025-06-17 DIAGNOSIS — E05.90 HYPERTHYROIDISM: ICD-10-CM

## 2025-06-17 LAB
T4 FREE SERPL-MCNC: 1.9 NG/DL (ref 0.92–1.68)
TSH SERPL DL<=0.05 MIU/L-ACNC: 0.01 UIU/ML (ref 0.27–4.2)

## 2025-06-18 LAB — THYROPEROXIDASE AB SERPL IA-ACNC: 50 IU/ML (ref 0–25)

## 2025-06-19 LAB — TSH RECEP AB SER-ACNC: < 1.1 IU/L

## 2025-06-20 ENCOUNTER — RESULTS FOLLOW-UP (OUTPATIENT)
Dept: FAMILY MEDICINE CLINIC | Age: 30
End: 2025-06-20

## 2025-06-20 DIAGNOSIS — E83.52 HYPERCALCEMIA: ICD-10-CM

## 2025-06-20 DIAGNOSIS — E05.90 HYPERTHYROIDISM: Primary | ICD-10-CM

## 2025-06-20 DIAGNOSIS — R74.8 ABNORMAL TRANSAMINASES: ICD-10-CM

## 2025-07-21 NOTE — PROGRESS NOTES
Chief Complaint   Patient presents with    Dizziness     Getting better since back to work  Still has some dizzy spells exerting self      History obtained from the patient.    SUBJECTIVE:  Krystal Lanza is a 29 y.o. female that presents today for     -Dizziness LAST VISIT:  Since her c/s 3 months ago, when bends over or squats down, when stands up, will get light headed. Lasts 30-60 sec and goes away  Also, not all the time, but at times, when exerts herself, will get light headed and a bit SOB  No CP, orthopnea or PND  Had gestational HTN with pregnancy, but BP's have been WNL and has been off Procardia XL since delivery  Never had this before  Otherwise feels well    UPDATE TODAY:   Here for f/u  Sxs present, but less frequent and much less intense     Labs completed ok other than:  Mild elevation Ca++  Mild LFT elevation  Suppressed TSH/elevated FT4. Neg Graves labs, +TPO. Denies s/s hyper thyroid  Overall doing much better  Sxs not totally gone, but much better  No syncope or near syncope  No GAGE at this point    Is scheduled for echo for Thursday.       -Thyroid problem:  On labs in June 2025, TSH low with high FT4  TSI negative  TPO +  No palpitations, heart racing, diarrhea or wt changes  Did f/u labs today, results pending      -Asthma:  On prn alb  Doesn't need often  Denies cough, wheezing or SOB      Age/Gender Health Maintenance    Lipid -   Lab Results   Component Value Date    CHOL 190 03/13/2023     Lab Results   Component Value Date    TRIG 183 03/13/2023     Lab Results   Component Value Date    HDL 47 03/13/2023     Lab Results   Component Value Date     03/13/2023       DM Screen -   Lab Results   Component Value Date/Time    GLUCOSE 89 06/13/2025 10:41 AM     Lab Results   Component Value Date/Time    LABA1C 5.1 12/18/2023 06:35 AM    LABA1C 5.2 03/13/2023 05:28 PM       Colon Cancer Screening - 2 rectal polyps MAY 2021, repeat 5 years per Wander GARCIA (completed d/t [facility])  Lung

## 2025-07-22 ENCOUNTER — LAB (OUTPATIENT)
Dept: LAB | Age: 30
End: 2025-07-22

## 2025-07-22 ENCOUNTER — OFFICE VISIT (OUTPATIENT)
Dept: FAMILY MEDICINE CLINIC | Age: 30
End: 2025-07-22
Payer: COMMERCIAL

## 2025-07-22 VITALS
BODY MASS INDEX: 45.18 KG/M2 | DIASTOLIC BLOOD PRESSURE: 70 MMHG | HEART RATE: 90 BPM | WEIGHT: 255 LBS | SYSTOLIC BLOOD PRESSURE: 122 MMHG | OXYGEN SATURATION: 97 % | TEMPERATURE: 97.4 F | RESPIRATION RATE: 18 BRPM | HEIGHT: 63 IN

## 2025-07-22 DIAGNOSIS — E83.52 HYPERCALCEMIA: ICD-10-CM

## 2025-07-22 DIAGNOSIS — J45.20 MILD INTERMITTENT ASTHMA WITHOUT COMPLICATION: Chronic | ICD-10-CM

## 2025-07-22 DIAGNOSIS — R74.8 ABNORMAL TRANSAMINASES: ICD-10-CM

## 2025-07-22 DIAGNOSIS — I95.1 ORTHOSTATIC HYPOTENSION: Primary | ICD-10-CM

## 2025-07-22 DIAGNOSIS — E05.90 HYPERTHYROIDISM: ICD-10-CM

## 2025-07-22 DIAGNOSIS — R06.09 DOE (DYSPNEA ON EXERTION): ICD-10-CM

## 2025-07-22 DIAGNOSIS — R55 NEAR SYNCOPE: ICD-10-CM

## 2025-07-22 DIAGNOSIS — R76.8 ANTI-TPO ANTIBODIES PRESENT: ICD-10-CM

## 2025-07-22 LAB
ALBUMIN SERPL BCG-MCNC: 4.3 G/DL (ref 3.4–4.9)
ALP SERPL-CCNC: 104 U/L (ref 38–126)
ALT SERPL W/O P-5'-P-CCNC: 24 U/L (ref 10–35)
ANION GAP SERPL CALC-SCNC: 11 MEQ/L (ref 8–16)
AST SERPL-CCNC: 20 U/L (ref 10–35)
BILIRUB SERPL-MCNC: 1 MG/DL (ref 0.3–1.2)
BUN SERPL-MCNC: 12 MG/DL (ref 8–23)
CALCIUM SERPL-MCNC: 11 MG/DL (ref 8.6–10)
CHLORIDE SERPL-SCNC: 105 MEQ/L (ref 98–111)
CO2 SERPL-SCNC: 26 MEQ/L (ref 22–29)
CREAT SERPL-MCNC: 0.7 MG/DL (ref 0.5–0.9)
GFR SERPL CREATININE-BSD FRML MDRD: > 90 ML/MIN/1.73M2
GLUCOSE SERPL-MCNC: 86 MG/DL (ref 74–109)
POTASSIUM SERPL-SCNC: 4 MEQ/L (ref 3.5–5.2)
PROT SERPL-MCNC: 7.1 G/DL (ref 6.4–8.3)
SODIUM SERPL-SCNC: 142 MEQ/L (ref 135–145)
T4 FREE SERPL-MCNC: 0.7 NG/DL (ref 0.92–1.68)
TSH SERPL DL<=0.05 MIU/L-ACNC: 14.5 UIU/ML (ref 0.27–4.2)

## 2025-07-22 PROCEDURE — 99214 OFFICE O/P EST MOD 30 MIN: CPT | Performed by: FAMILY MEDICINE

## 2025-07-23 ENCOUNTER — RESULTS FOLLOW-UP (OUTPATIENT)
Dept: FAMILY MEDICINE CLINIC | Age: 30
End: 2025-07-23

## 2025-07-23 DIAGNOSIS — E06.3 HYPOTHYROIDISM DUE TO HASHIMOTO'S THYROIDITIS: Primary | ICD-10-CM

## 2025-07-23 DIAGNOSIS — E83.52 HYPERCALCEMIA: ICD-10-CM

## 2025-07-23 RX ORDER — LEVOTHYROXINE SODIUM 50 UG/1
50 TABLET ORAL DAILY
Qty: 30 TABLET | Refills: 5 | Status: SHIPPED | OUTPATIENT
Start: 2025-07-23

## 2025-07-23 NOTE — TELEPHONE ENCOUNTER
----- Message from Dr. Ceasar Jung, DO sent at 7/23/2025  6:44 AM EDT -----  Please let pt know that labs are back.  -Thyroid has now become underactive, as anticipated. Recommend we start synthroid 50mcg daily. Repeat TSH in 6 wks.   -Ca++ level remains mildly elevated, will get f/u labs on this. She can complete those in 6 wks with TSH, fasting.   -Liver enzymes have returned to normal.     Let me know if questions, thanks!

## 2025-07-24 ENCOUNTER — HOSPITAL ENCOUNTER (OUTPATIENT)
Age: 30
Discharge: HOME | End: 2025-07-26
Attending: FAMILY MEDICINE
Payer: COMMERCIAL

## 2025-07-24 DIAGNOSIS — I95.1 ORTHOSTATIC HYPOTENSION: ICD-10-CM

## 2025-07-24 DIAGNOSIS — R06.09 DOE (DYSPNEA ON EXERTION): ICD-10-CM

## 2025-07-24 DIAGNOSIS — R55 NEAR SYNCOPE: ICD-10-CM

## 2025-07-24 LAB
ECHO AO ASC DIAM: 2.5 CM
ECHO AO SINUS VALSALVA DIAM: 2.6 CM
ECHO AO ST JNCT DIAM: 2.1 CM
ECHO AV AREA PEAK VELOCITY: 2.1 CM2
ECHO AV AREA VTI: 2 CM2
ECHO AV CUSP MM: 1.8 CM
ECHO AV MEAN GRADIENT: 4 MMHG
ECHO AV MEAN VELOCITY: 0.9 M/S
ECHO AV PEAK GRADIENT: 7 MMHG
ECHO AV PEAK VELOCITY: 1.4 M/S
ECHO AV VELOCITY RATIO: 0.71
ECHO AV VTI: 27.4 CM
ECHO EST RA PRESSURE: 3 MMHG
ECHO LA AREA 2C: 11.4 CM2
ECHO LA AREA 4C: 12.8 CM2
ECHO LA DIAMETER: 3.6 CM
ECHO LA MAJOR AXIS: 5 CM
ECHO LA MINOR AXIS: 4.7 CM
ECHO LA VOL BP: 24 ML (ref 22–52)
ECHO LA VOL MOD A2C: 22 ML (ref 22–52)
ECHO LA VOL MOD A4C: 26 ML (ref 22–52)
ECHO LV E' LATERAL VELOCITY: 12 CM/S
ECHO LV E' SEPTAL VELOCITY: 10.8 CM/S
ECHO LV EF PHYSICIAN: 50 %
ECHO LV FRACTIONAL SHORTENING: 27 % (ref 28–44)
ECHO LV INTERNAL DIMENSION DIASTOLIC: 4.1 CM (ref 3.9–5.3)
ECHO LV INTERNAL DIMENSION SYSTOLIC: 3 CM
ECHO LV ISOVOLUMETRIC RELAXATION TIME (IVRT): 102 MS
ECHO LV IVSD: 1.1 CM (ref 0.6–0.9)
ECHO LV MASS 2D: 151.3 G (ref 67–162)
ECHO LV POSTERIOR WALL DIASTOLIC: 1.1 CM (ref 0.6–0.9)
ECHO LV RELATIVE WALL THICKNESS RATIO: 0.54
ECHO LVOT AREA: 2.8 CM2
ECHO LVOT AV VTI INDEX: 0.7
ECHO LVOT DIAM: 1.9 CM
ECHO LVOT MEAN GRADIENT: 2 MMHG
ECHO LVOT PEAK GRADIENT: 4 MMHG
ECHO LVOT PEAK VELOCITY: 1 M/S
ECHO LVOT SV: 54.4 ML
ECHO LVOT VTI: 19.2 CM
ECHO MV A VELOCITY: 0.76 M/S
ECHO MV E DECELERATION TIME (DT): 189 MS
ECHO MV E VELOCITY: 0.94 M/S
ECHO MV E/A RATIO: 1.24
ECHO MV E/E' LATERAL: 7.83
ECHO MV E/E' RATIO (AVERAGED): 8.27
ECHO MV E/E' SEPTAL: 8.7
ECHO PV MAX VELOCITY: 0.8 M/S
ECHO PV PEAK GRADIENT: 3 MMHG
ECHO RIGHT VENTRICULAR SYSTOLIC PRESSURE (RVSP): 15 MMHG
ECHO RV FREE WALL PEAK S': 12.6 CM/S
ECHO RV INTERNAL DIMENSION: 2.3 CM
ECHO RV TAPSE: 2.1 CM (ref 1.7–?)
ECHO TV E WAVE: 0.5 M/S
ECHO TV REGURGITANT MAX VELOCITY: 1.71 M/S
ECHO TV REGURGITANT PEAK GRADIENT: 12 MMHG

## 2025-07-24 PROCEDURE — 93306 TTE W/DOPPLER COMPLETE: CPT | Performed by: INTERNAL MEDICINE

## 2025-07-24 PROCEDURE — 93306 TTE W/DOPPLER COMPLETE: CPT

## 2025-08-02 ENCOUNTER — PATIENT MESSAGE (OUTPATIENT)
Dept: FAMILY MEDICINE CLINIC | Age: 30
End: 2025-08-02

## 2025-08-20 PROBLEM — E83.52 HYPERCALCEMIA: Status: ACTIVE | Noted: 2025-08-20

## 2025-08-21 ENCOUNTER — LAB (OUTPATIENT)
Dept: LAB | Age: 30
End: 2025-08-21

## 2025-08-21 ENCOUNTER — OFFICE VISIT (OUTPATIENT)
Dept: FAMILY MEDICINE CLINIC | Age: 30
End: 2025-08-21

## 2025-08-21 VITALS
BODY MASS INDEX: 44.3 KG/M2 | WEIGHT: 250 LBS | HEART RATE: 77 BPM | SYSTOLIC BLOOD PRESSURE: 126 MMHG | DIASTOLIC BLOOD PRESSURE: 80 MMHG | RESPIRATION RATE: 16 BRPM | HEIGHT: 63 IN | TEMPERATURE: 97.8 F | OXYGEN SATURATION: 98 %

## 2025-08-21 DIAGNOSIS — E06.3 HYPOTHYROIDISM DUE TO HASHIMOTO'S THYROIDITIS: Chronic | ICD-10-CM

## 2025-08-21 DIAGNOSIS — R55 NEAR SYNCOPE: ICD-10-CM

## 2025-08-21 DIAGNOSIS — R76.8 ANTI-TPO ANTIBODIES PRESENT: ICD-10-CM

## 2025-08-21 DIAGNOSIS — R06.09 DOE (DYSPNEA ON EXERTION): ICD-10-CM

## 2025-08-21 DIAGNOSIS — E83.52 HYPERCALCEMIA: ICD-10-CM

## 2025-08-21 DIAGNOSIS — R74.8 ABNORMAL TRANSAMINASES: ICD-10-CM

## 2025-08-21 DIAGNOSIS — J45.20 MILD INTERMITTENT ASTHMA WITHOUT COMPLICATION: ICD-10-CM

## 2025-08-21 DIAGNOSIS — E06.3 HYPOTHYROIDISM DUE TO HASHIMOTO'S THYROIDITIS: ICD-10-CM

## 2025-08-21 DIAGNOSIS — I95.1 ORTHOSTATIC HYPOTENSION: Primary | ICD-10-CM

## 2025-08-21 LAB
ANION GAP SERPL CALC-SCNC: 11 MEQ/L (ref 8–16)
BUN SERPL-MCNC: 14 MG/DL (ref 8–23)
CALCIUM SERPL-MCNC: 10.8 MG/DL (ref 8.5–10.5)
CHLORIDE SERPL-SCNC: 104 MEQ/L (ref 98–111)
CO2 SERPL-SCNC: 26 MEQ/L (ref 22–29)
CREAT SERPL-MCNC: 0.8 MG/DL (ref 0.5–0.9)
GFR SERPL CREATININE-BSD FRML MDRD: > 90 ML/MIN/1.73M2
GLUCOSE SERPL-MCNC: 84 MG/DL (ref 74–109)
POTASSIUM SERPL-SCNC: 4.3 MEQ/L (ref 3.5–5.2)
PTH-INTACT SERPL-MCNC: 80 PG/ML (ref 15–65)
SODIUM SERPL-SCNC: 141 MEQ/L (ref 135–145)
T4 FREE SERPL-MCNC: 1.1 NG/DL (ref 0.92–1.68)
TSH SERPL DL<=0.05 MIU/L-ACNC: 4.21 UIU/ML (ref 0.27–4.2)

## 2025-08-22 ENCOUNTER — TELEPHONE (OUTPATIENT)
Dept: FAMILY MEDICINE CLINIC | Age: 30
End: 2025-08-22

## 2025-08-22 PROBLEM — E21.0 HYPERPARATHYROIDISM, PRIMARY: Status: ACTIVE | Noted: 2025-08-22

## 2025-08-22 LAB — CA-I SERPL ISE-SCNC: NORMAL MMOL/L

## (undated) DEVICE — SUTURE ABSRB MFIL VLT CT 3-0 - 27IN PDS II Z338H

## (undated) DEVICE — SUTURE VICRYL + SZ 1-0 L36IN ABSRB UD CTX 1/2 CIR TAPR PNT VCP977H

## (undated) DEVICE — SUTURE VICRYL PLUS TAPERPOINT ANTIBAC BRD SH NDL 3-0

## (undated) DEVICE — SPONGE LAP W18XL18IN WHT COT 4 PLY FLD STRUNG RADPQ DISP ST 2 PER PACK

## (undated) DEVICE — SOLUTION SURG PREP 26 CC PURPREP

## (undated) DEVICE — GLOVE SURG SZ 65 THK91MIL LTX FREE SYN POLYISOPRENE

## (undated) DEVICE — SOLUTION IRRIG 1000ML 0.9% SOD CHL USP POUR PLAS BTL

## (undated) DEVICE — C-SECTION-LF: Brand: MEDLINE INDUSTRIES, INC.

## (undated) DEVICE — SOLUTION SCRB 4OZ 4% CHG H2O AIDED FOR PREOPERATIVE SKIN

## (undated) DEVICE — DRESSING HYDROFIBER AQUACEL AG ADVANTAGE 3.5X12 IN

## (undated) DEVICE — SOLUTION IRRIG 1000ML STRL H2O USP PLAS POUR BTL

## (undated) DEVICE — GLOVE ORANGE PI 7   MSG9070

## (undated) DEVICE — LARGE, DISPOSABLE ALEXIS O C-SECTION PROTECTOR - RETRACTOR: Brand: ALEXIS ® O C-SECTION PROTECTOR - RETRACTOR

## (undated) DEVICE — SUTURE VICRYL SZ 4-0 L27IN ABSRB UD L60MM KS STR REV CUT NDL J662H

## (undated) DEVICE — SUTURE VICRYL + SZ 0 L36IN ABSRB VLT L36MM CT-1 1/2 CIR VCP346H